# Patient Record
Sex: MALE | Race: WHITE | Employment: FULL TIME | URBAN - METROPOLITAN AREA
[De-identification: names, ages, dates, MRNs, and addresses within clinical notes are randomized per-mention and may not be internally consistent; named-entity substitution may affect disease eponyms.]

---

## 2020-02-15 ENCOUNTER — APPOINTMENT (OUTPATIENT)
Dept: GENERAL RADIOLOGY | Age: 25
DRG: 026 | End: 2020-02-15
Payer: MEDICAID

## 2020-02-15 ENCOUNTER — APPOINTMENT (OUTPATIENT)
Dept: CT IMAGING | Age: 25
DRG: 026 | End: 2020-02-15
Payer: MEDICAID

## 2020-02-15 ENCOUNTER — ANESTHESIA EVENT (OUTPATIENT)
Dept: EMERGENCY DEPT | Age: 25
DRG: 026 | End: 2020-02-15
Payer: MEDICAID

## 2020-02-15 ENCOUNTER — ANESTHESIA (OUTPATIENT)
Dept: EMERGENCY DEPT | Age: 25
DRG: 026 | End: 2020-02-15
Payer: MEDICAID

## 2020-02-15 ENCOUNTER — HOSPITAL ENCOUNTER (INPATIENT)
Age: 25
LOS: 5 days | Discharge: HOME | DRG: 026 | End: 2020-02-20
Attending: EMERGENCY MEDICINE | Admitting: SURGERY
Payer: MEDICAID

## 2020-02-15 PROBLEM — T14.90XA TRAUMA: Status: ACTIVE | Noted: 2020-02-15

## 2020-02-15 LAB
ABO/RH: NORMAL
ACETAMINOPHEN LEVEL: <5 MCG/ML (ref 10–30)
ALBUMIN SERPL-MCNC: 4.5 G/DL (ref 3.5–5.2)
ALP BLD-CCNC: 71 U/L (ref 40–129)
ALT SERPL-CCNC: 12 U/L (ref 0–40)
ANGLE (CLOT STRENGTH): 74.6 DEGREE (ref 59–74)
ANION GAP SERPL CALCULATED.3IONS-SCNC: 19 MMOL/L (ref 7–16)
ANTIBODY SCREEN: NORMAL
APTT: 24.6 SEC (ref 24.5–35.1)
AST SERPL-CCNC: 16 U/L (ref 0–39)
B.E.: -2.6 MMOL/L (ref -3–3)
BILIRUB SERPL-MCNC: 1 MG/DL (ref 0–1.2)
BUN BLDV-MCNC: 27 MG/DL (ref 6–20)
CALCIUM SERPL-MCNC: 9.6 MG/DL (ref 8.6–10.2)
CHLORIDE BLD-SCNC: 97 MMOL/L (ref 98–107)
CO2: 20 MMOL/L (ref 22–29)
COHB: 1.1 % (ref 0–1.5)
CREAT SERPL-MCNC: 0.9 MG/DL (ref 0.7–1.2)
CRITICAL: ABNORMAL
DATE ANALYZED: ABNORMAL
DATE OF COLLECTION: ABNORMAL
EPL-TEG: 3.2 % (ref 0–15)
ETHANOL: <10 MG/DL (ref 0–0.08)
G-TEG: 9.8 K D/SC (ref 4.5–11)
GFR AFRICAN AMERICAN: >60
GFR NON-AFRICAN AMERICAN: >60 ML/MIN/1.73
GLUCOSE BLD-MCNC: 208 MG/DL (ref 74–99)
HCO3: 21 MMOL/L (ref 22–26)
HCT VFR BLD CALC: 46 % (ref 37–54)
HEMOGLOBIN: 14.6 G/DL (ref 12.5–16.5)
HHB: 0.4 % (ref 0–5)
INR BLD: 1
K (CLOTTING TIME): 1.1 MIN (ref 1–3)
LAB: ABNORMAL
LACTIC ACID: 6 MMOL/L (ref 0.5–2.2)
LY30 (FIBRINOLYSIS): 3.2 % (ref 0–8)
Lab: ABNORMAL
MA (MAX AMPLITUDE): 66.2 MM (ref 50–70)
MCH RBC QN AUTO: 28.5 PG (ref 26–35)
MCHC RBC AUTO-ENTMCNC: 31.7 % (ref 32–34.5)
MCV RBC AUTO: 89.7 FL (ref 80–99.9)
METHB: 0.4 % (ref 0–1.5)
MODE: ABNORMAL
O2 CONTENT: 22.7 ML/DL
O2 SATURATION: 99.6 % (ref 92–98.5)
O2HB: 98.1 % (ref 94–97)
OPERATOR ID: 5721
PATIENT TEMP: 37 C
PCO2: 33.7 MMHG (ref 35–45)
PDW BLD-RTO: 12.8 FL (ref 11.5–15)
PH BLOOD GAS: 7.41 (ref 7.35–7.45)
PLATELET # BLD: 218 E9/L (ref 130–450)
PMV BLD AUTO: 10.1 FL (ref 7–12)
PO2: 453.2 MMHG (ref 75–100)
POTASSIUM SERPL-SCNC: 3.5 MMOL/L (ref 3.5–5)
POTASSIUM SERPL-SCNC: 3.55 MMOL/L (ref 3.5–5)
PROTHROMBIN TIME: 11.6 SEC (ref 9.3–12.4)
R (REACTION TIME): 3.4 MIN (ref 5–10)
RBC # BLD: 5.13 E12/L (ref 3.8–5.8)
SALICYLATE, SERUM: <0.3 MG/DL (ref 0–30)
SODIUM BLD-SCNC: 136 MMOL/L (ref 132–146)
SOURCE, BLOOD GAS: ABNORMAL
THB: 15.6 G/DL (ref 11.5–16.5)
TIME ANALYZED: 2000
TOTAL PROTEIN: 7.2 G/DL (ref 6.4–8.3)
TRICYCLIC ANTIDEPRESSANTS SCREEN SERUM: NEGATIVE NG/ML
WBC # BLD: 17.7 E9/L (ref 4.5–11.5)

## 2020-02-15 PROCEDURE — 82805 BLOOD GASES W/O2 SATURATION: CPT

## 2020-02-15 PROCEDURE — 85610 PROTHROMBIN TIME: CPT

## 2020-02-15 PROCEDURE — 12013 RPR F/E/E/N/L/M 2.6-5.0 CM: CPT | Performed by: SURGERY

## 2020-02-15 PROCEDURE — 0JQ10ZZ REPAIR FACE SUBCUTANEOUS TISSUE AND FASCIA, OPEN APPROACH: ICD-10-PCS | Performed by: SURGERY

## 2020-02-15 PROCEDURE — 70498 CT ANGIOGRAPHY NECK: CPT

## 2020-02-15 PROCEDURE — 86850 RBC ANTIBODY SCREEN: CPT

## 2020-02-15 PROCEDURE — 70486 CT MAXILLOFACIAL W/O DYE: CPT

## 2020-02-15 PROCEDURE — 99285 EMERGENCY DEPT VISIT HI MDM: CPT

## 2020-02-15 PROCEDURE — 6810039001 HC L1 TRAUMA PRIORITY

## 2020-02-15 PROCEDURE — 6360000004 HC RX CONTRAST MEDICATION: Performed by: RADIOLOGY

## 2020-02-15 PROCEDURE — 6360000002 HC RX W HCPCS

## 2020-02-15 PROCEDURE — 71045 X-RAY EXAM CHEST 1 VIEW: CPT

## 2020-02-15 PROCEDURE — 2580000003 HC RX 258: Performed by: STUDENT IN AN ORGANIZED HEALTH CARE EDUCATION/TRAINING PROGRAM

## 2020-02-15 PROCEDURE — 72128 CT CHEST SPINE W/O DYE: CPT

## 2020-02-15 PROCEDURE — 85576 BLOOD PLATELET AGGREGATION: CPT

## 2020-02-15 PROCEDURE — 71260 CT THORAX DX C+: CPT

## 2020-02-15 PROCEDURE — 72170 X-RAY EXAM OF PELVIS: CPT

## 2020-02-15 PROCEDURE — 80307 DRUG TEST PRSMV CHEM ANLYZR: CPT

## 2020-02-15 PROCEDURE — 86901 BLOOD TYPING SEROLOGIC RH(D): CPT

## 2020-02-15 PROCEDURE — 2000000000 HC ICU R&B

## 2020-02-15 PROCEDURE — 02HV33Z INSERTION OF INFUSION DEVICE INTO SUPERIOR VENA CAVA, PERCUTANEOUS APPROACH: ICD-10-PCS | Performed by: SURGERY

## 2020-02-15 PROCEDURE — 72131 CT LUMBAR SPINE W/O DYE: CPT

## 2020-02-15 PROCEDURE — 6360000002 HC RX W HCPCS: Performed by: STUDENT IN AN ORGANIZED HEALTH CARE EDUCATION/TRAINING PROGRAM

## 2020-02-15 PROCEDURE — 87081 CULTURE SCREEN ONLY: CPT

## 2020-02-15 PROCEDURE — 0BH17EZ INSERTION OF ENDOTRACHEAL AIRWAY INTO TRACHEA, VIA NATURAL OR ARTIFICIAL OPENING: ICD-10-PCS | Performed by: SURGERY

## 2020-02-15 PROCEDURE — 36415 COLL VENOUS BLD VENIPUNCTURE: CPT

## 2020-02-15 PROCEDURE — 72125 CT NECK SPINE W/O DYE: CPT

## 2020-02-15 PROCEDURE — 70450 CT HEAD/BRAIN W/O DYE: CPT

## 2020-02-15 PROCEDURE — 36620 INSERTION CATHETER ARTERY: CPT | Performed by: SURGERY

## 2020-02-15 PROCEDURE — 31500 INSERT EMERGENCY AIRWAY: CPT

## 2020-02-15 PROCEDURE — 85027 COMPLETE CBC AUTOMATED: CPT

## 2020-02-15 PROCEDURE — 6370000000 HC RX 637 (ALT 250 FOR IP): Performed by: STUDENT IN AN ORGANIZED HEALTH CARE EDUCATION/TRAINING PROGRAM

## 2020-02-15 PROCEDURE — 94002 VENT MGMT INPAT INIT DAY: CPT

## 2020-02-15 PROCEDURE — 83605 ASSAY OF LACTIC ACID: CPT

## 2020-02-15 PROCEDURE — 31500 INSERT EMERGENCY AIRWAY: CPT | Performed by: NURSE ANESTHETIST, CERTIFIED REGISTERED

## 2020-02-15 PROCEDURE — 85384 FIBRINOGEN ACTIVITY: CPT

## 2020-02-15 PROCEDURE — 86900 BLOOD TYPING SEROLOGIC ABO: CPT

## 2020-02-15 PROCEDURE — 2500000003 HC RX 250 WO HCPCS: Performed by: STUDENT IN AN ORGANIZED HEALTH CARE EDUCATION/TRAINING PROGRAM

## 2020-02-15 PROCEDURE — 85730 THROMBOPLASTIN TIME PARTIAL: CPT

## 2020-02-15 PROCEDURE — 5A1945Z RESPIRATORY VENTILATION, 24-96 CONSECUTIVE HOURS: ICD-10-PCS | Performed by: SURGERY

## 2020-02-15 PROCEDURE — 0JQ00ZZ REPAIR SCALP SUBCUTANEOUS TISSUE AND FASCIA, OPEN APPROACH: ICD-10-PCS | Performed by: SURGERY

## 2020-02-15 PROCEDURE — G0480 DRUG TEST DEF 1-7 CLASSES: HCPCS

## 2020-02-15 PROCEDURE — 36620 INSERTION CATHETER ARTERY: CPT

## 2020-02-15 PROCEDURE — 74177 CT ABD & PELVIS W/CONTRAST: CPT

## 2020-02-15 PROCEDURE — 80053 COMPREHEN METABOLIC PANEL: CPT

## 2020-02-15 PROCEDURE — 12002 RPR S/N/AX/GEN/TRNK2.6-7.5CM: CPT | Performed by: SURGERY

## 2020-02-15 PROCEDURE — 84132 ASSAY OF SERUM POTASSIUM: CPT

## 2020-02-15 PROCEDURE — 36556 INSERT NON-TUNNEL CV CATH: CPT | Performed by: SURGERY

## 2020-02-15 PROCEDURE — 2580000003 HC RX 258: Performed by: RADIOLOGY

## 2020-02-15 PROCEDURE — 85347 COAGULATION TIME ACTIVATED: CPT

## 2020-02-15 PROCEDURE — 36569 INSJ PICC 5 YR+ W/O IMAGING: CPT

## 2020-02-15 PROCEDURE — 99291 CRITICAL CARE FIRST HOUR: CPT | Performed by: SURGERY

## 2020-02-15 RX ORDER — LEVETIRACETAM 500 MG/1
1000 TABLET ORAL 2 TIMES DAILY
Status: DISCONTINUED | OUTPATIENT
Start: 2020-02-16 | End: 2020-02-16 | Stop reason: CLARIF

## 2020-02-15 RX ORDER — SODIUM CHLORIDE 9 MG/ML
INJECTION, SOLUTION INTRAVENOUS CONTINUOUS
Status: DISCONTINUED | OUTPATIENT
Start: 2020-02-15 | End: 2020-02-18

## 2020-02-15 RX ORDER — MIDAZOLAM HYDROCHLORIDE 1 MG/ML
4 INJECTION INTRAMUSCULAR; INTRAVENOUS ONCE
Status: COMPLETED | OUTPATIENT
Start: 2020-02-15 | End: 2020-02-15

## 2020-02-15 RX ORDER — LEVETIRACETAM 10 MG/ML
INJECTION INTRAVASCULAR
Status: COMPLETED
Start: 2020-02-15 | End: 2020-02-15

## 2020-02-15 RX ORDER — SODIUM CHLORIDE 0.9 % (FLUSH) 0.9 %
10 SYRINGE (ML) INJECTION EVERY 12 HOURS SCHEDULED
Status: DISCONTINUED | OUTPATIENT
Start: 2020-02-15 | End: 2020-02-20 | Stop reason: HOSPADM

## 2020-02-15 RX ORDER — SODIUM CHLORIDE 9 MG/ML
10 INJECTION INTRAVENOUS DAILY
Status: DISCONTINUED | OUTPATIENT
Start: 2020-02-16 | End: 2020-02-17

## 2020-02-15 RX ORDER — PROPOFOL 10 MG/ML
INJECTION, EMULSION INTRAVENOUS
Status: COMPLETED
Start: 2020-02-15 | End: 2020-02-15

## 2020-02-15 RX ORDER — PROPOFOL 10 MG/ML
10 INJECTION, EMULSION INTRAVENOUS
Status: DISCONTINUED | OUTPATIENT
Start: 2020-02-15 | End: 2020-02-16

## 2020-02-15 RX ORDER — SODIUM CHLORIDE 0.9 % (FLUSH) 0.9 %
10 SYRINGE (ML) INJECTION PRN
Status: DISCONTINUED | OUTPATIENT
Start: 2020-02-15 | End: 2020-02-20 | Stop reason: HOSPADM

## 2020-02-15 RX ORDER — SODIUM CHLORIDE 0.9 % (FLUSH) 0.9 %
10 SYRINGE (ML) INJECTION ONCE
Status: COMPLETED | OUTPATIENT
Start: 2020-02-15 | End: 2020-02-15

## 2020-02-15 RX ORDER — CHLORHEXIDINE GLUCONATE 0.12 MG/ML
15 RINSE ORAL 2 TIMES DAILY
Status: DISCONTINUED | OUTPATIENT
Start: 2020-02-15 | End: 2020-02-17

## 2020-02-15 RX ORDER — FENTANYL CITRATE 50 UG/ML
INJECTION, SOLUTION INTRAMUSCULAR; INTRAVENOUS
Status: DISCONTINUED
Start: 2020-02-15 | End: 2020-02-16 | Stop reason: WASHOUT

## 2020-02-15 RX ORDER — LORAZEPAM 2 MG/ML
INJECTION INTRAMUSCULAR
Status: DISCONTINUED
Start: 2020-02-15 | End: 2020-02-16 | Stop reason: WASHOUT

## 2020-02-15 RX ORDER — MIDAZOLAM HYDROCHLORIDE 1 MG/ML
INJECTION INTRAMUSCULAR; INTRAVENOUS
Status: COMPLETED
Start: 2020-02-15 | End: 2020-02-15

## 2020-02-15 RX ORDER — PANTOPRAZOLE SODIUM 40 MG/10ML
40 INJECTION, POWDER, LYOPHILIZED, FOR SOLUTION INTRAVENOUS DAILY
Status: DISCONTINUED | OUTPATIENT
Start: 2020-02-16 | End: 2020-02-17

## 2020-02-15 RX ORDER — ACETAMINOPHEN 325 MG/1
650 TABLET ORAL EVERY 4 HOURS PRN
Status: DISCONTINUED | OUTPATIENT
Start: 2020-02-15 | End: 2020-02-16

## 2020-02-15 RX ORDER — FENTANYL CITRATE 50 UG/ML
INJECTION, SOLUTION INTRAMUSCULAR; INTRAVENOUS
Status: COMPLETED
Start: 2020-02-15 | End: 2020-02-15

## 2020-02-15 RX ORDER — ONDANSETRON 2 MG/ML
4 INJECTION INTRAMUSCULAR; INTRAVENOUS EVERY 6 HOURS PRN
Status: DISCONTINUED | OUTPATIENT
Start: 2020-02-15 | End: 2020-02-20 | Stop reason: HOSPADM

## 2020-02-15 RX ORDER — VECURONIUM BROMIDE 1 MG/ML
INJECTION, POWDER, LYOPHILIZED, FOR SOLUTION INTRAVENOUS
Status: DISPENSED
Start: 2020-02-15 | End: 2020-02-16

## 2020-02-15 RX ADMIN — SODIUM CHLORIDE: 9 INJECTION, SOLUTION INTRAVENOUS at 21:28

## 2020-02-15 RX ADMIN — SODIUM CHLORIDE, PRESERVATIVE FREE 10 ML: 5 INJECTION INTRAVENOUS at 21:30

## 2020-02-15 RX ADMIN — MIDAZOLAM 4 MG: 1 INJECTION INTRAMUSCULAR; INTRAVENOUS at 21:24

## 2020-02-15 RX ADMIN — MIDAZOLAM HYDROCHLORIDE 4 MG: 1 INJECTION INTRAMUSCULAR; INTRAVENOUS at 21:24

## 2020-02-15 RX ADMIN — IOPAMIDOL 110 ML: 755 INJECTION, SOLUTION INTRAVENOUS at 20:44

## 2020-02-15 RX ADMIN — PROPOFOL INJECTABLE EMULSION 50 MCG/KG/MIN: 10 INJECTION, EMULSION INTRAVENOUS at 23:29

## 2020-02-15 RX ADMIN — Medication 10 ML: at 21:29

## 2020-02-15 RX ADMIN — DEXTROSE MONOHYDRATE 1250 MG: 50 INJECTION, SOLUTION INTRAVENOUS at 23:30

## 2020-02-15 RX ADMIN — PROPOFOL INJECTABLE EMULSION 40 MCG/KG/MIN: 10 INJECTION, EMULSION INTRAVENOUS at 20:02

## 2020-02-15 RX ADMIN — Medication 25 MCG/HR: at 21:26

## 2020-02-15 RX ADMIN — FENTANYL CITRATE 100 MCG/ML: 0.05 INJECTION, SOLUTION INTRAMUSCULAR; INTRAVENOUS at 20:11

## 2020-02-15 RX ADMIN — LEVETIRACETAM: 10 INJECTION, SOLUTION INTRAVENOUS at 20:04

## 2020-02-15 RX ADMIN — CHLORHEXIDINE GLUCONATE 0.12% ORAL RINSE 15 ML: 1.2 LIQUID ORAL at 21:31

## 2020-02-15 RX ADMIN — CEFEPIME HYDROCHLORIDE 2 G: 2 INJECTION, POWDER, FOR SOLUTION INTRAVENOUS at 23:27

## 2020-02-15 ASSESSMENT — PAIN SCALES - GENERAL
PAINLEVEL_OUTOF10: 8
PAINLEVEL_OUTOF10: 0

## 2020-02-15 ASSESSMENT — PULMONARY FUNCTION TESTS: PIF_VALUE: 19

## 2020-02-16 ENCOUNTER — APPOINTMENT (OUTPATIENT)
Dept: CT IMAGING | Age: 25
DRG: 026 | End: 2020-02-16
Payer: MEDICAID

## 2020-02-16 ENCOUNTER — APPOINTMENT (OUTPATIENT)
Dept: GENERAL RADIOLOGY | Age: 25
DRG: 026 | End: 2020-02-16
Payer: MEDICAID

## 2020-02-16 PROBLEM — S06.5XAA SDH (SUBDURAL HEMATOMA): Status: ACTIVE | Noted: 2020-02-16

## 2020-02-16 PROBLEM — S02.92XA CLOSED EXTENSIVE FACIAL FRACTURES (HCC): Status: ACTIVE | Noted: 2020-02-16

## 2020-02-16 PROBLEM — S02.40DA CLOSED FRACTURE OF LEFT SIDE OF MAXILLA (HCC): Status: ACTIVE | Noted: 2020-02-16

## 2020-02-16 PROBLEM — S01.01XA SCALP LACERATION: Status: ACTIVE | Noted: 2020-02-16

## 2020-02-16 PROBLEM — S02.85XA RIGHT ORBITAL FRACTURE (HCC): Status: ACTIVE | Noted: 2020-02-16

## 2020-02-16 PROBLEM — S01.412A LACERATION OF LEFT CHEEK: Status: ACTIVE | Noted: 2020-02-16

## 2020-02-16 PROBLEM — I60.9 SAH (SUBARACHNOID HEMORRHAGE) (HCC): Status: ACTIVE | Noted: 2020-02-16

## 2020-02-16 PROBLEM — E87.20 LACTIC ACIDOSIS: Status: ACTIVE | Noted: 2020-02-16

## 2020-02-16 PROBLEM — J96.01 ACUTE RESPIRATORY FAILURE WITH HYPOXIA (HCC): Status: ACTIVE | Noted: 2020-02-16

## 2020-02-16 PROBLEM — S02.91XB OPEN SKULL FRACTURE (HCC): Status: ACTIVE | Noted: 2020-02-16

## 2020-02-16 LAB
AADO2: 140.2 MMHG
ALBUMIN SERPL-MCNC: 3.3 G/DL (ref 3.5–5.2)
ALP BLD-CCNC: 51 U/L (ref 40–129)
ALT SERPL-CCNC: 8 U/L (ref 0–40)
AMPHETAMINE SCREEN, URINE: POSITIVE
ANION GAP SERPL CALCULATED.3IONS-SCNC: 10 MMOL/L (ref 7–16)
AST SERPL-CCNC: 23 U/L (ref 0–39)
B.E.: -0.7 MMOL/L (ref -3–3)
BARBITURATE SCREEN URINE: NOT DETECTED
BASOPHILS ABSOLUTE: 0.03 E9/L (ref 0–0.2)
BASOPHILS RELATIVE PERCENT: 0.4 % (ref 0–2)
BENZODIAZEPINE SCREEN, URINE: POSITIVE
BILIRUB SERPL-MCNC: 1.4 MG/DL (ref 0–1.2)
BUN BLDV-MCNC: 23 MG/DL (ref 6–20)
CALCIUM IONIZED: 1.2 MMOL/L (ref 1.15–1.33)
CALCIUM SERPL-MCNC: 8.1 MG/DL (ref 8.6–10.2)
CANNABINOID SCREEN URINE: POSITIVE
CHLORIDE BLD-SCNC: 107 MMOL/L (ref 98–107)
CO2: 22 MMOL/L (ref 22–29)
COCAINE METABOLITE SCREEN URINE: NOT DETECTED
COHB: 0.2 % (ref 0–1.5)
CREAT SERPL-MCNC: 0.8 MG/DL (ref 0.7–1.2)
CRITICAL: ABNORMAL
DATE ANALYZED: ABNORMAL
DATE OF COLLECTION: ABNORMAL
EOSINOPHILS ABSOLUTE: 0.1 E9/L (ref 0.05–0.5)
EOSINOPHILS RELATIVE PERCENT: 1.2 % (ref 0–6)
FENTANYL SCREEN, URINE: NOT DETECTED
FIO2: 50 %
GFR AFRICAN AMERICAN: >60
GFR NON-AFRICAN AMERICAN: >60 ML/MIN/1.73
GLUCOSE BLD-MCNC: 123 MG/DL (ref 74–99)
HBA1C MFR BLD: 5.6 % (ref 4–5.6)
HCO3: 22 MMOL/L (ref 22–26)
HCT VFR BLD CALC: 36.7 % (ref 37–54)
HEMOGLOBIN: 11.9 G/DL (ref 12.5–16.5)
HHB: 1.2 % (ref 0–5)
IMMATURE GRANULOCYTES #: 0.02 E9/L
IMMATURE GRANULOCYTES %: 0.2 % (ref 0–5)
LAB: ABNORMAL
LACTIC ACID: 0.8 MMOL/L (ref 0.5–2.2)
LACTIC ACID: 1.1 MMOL/L (ref 0.5–2.2)
LYMPHOCYTES ABSOLUTE: 1.41 E9/L (ref 1.5–4)
LYMPHOCYTES RELATIVE PERCENT: 16.6 % (ref 20–42)
Lab: ABNORMAL
Lab: ABNORMAL
MAGNESIUM: 2 MG/DL (ref 1.6–2.6)
MCH RBC QN AUTO: 28.9 PG (ref 26–35)
MCHC RBC AUTO-ENTMCNC: 32.4 % (ref 32–34.5)
MCV RBC AUTO: 89.1 FL (ref 80–99.9)
METER GLUCOSE: 106 MG/DL (ref 74–99)
METER GLUCOSE: 123 MG/DL (ref 74–99)
METER GLUCOSE: 127 MG/DL (ref 74–99)
METER GLUCOSE: 141 MG/DL (ref 74–99)
METER GLUCOSE: 54 MG/DL (ref 74–99)
METER GLUCOSE: 57 MG/DL (ref 74–99)
METER GLUCOSE: 72 MG/DL (ref 74–99)
METER GLUCOSE: 83 MG/DL (ref 74–99)
METHADONE SCREEN, URINE: NOT DETECTED
METHB: 0.2 % (ref 0–1.5)
MODE: AC
MONOCYTES ABSOLUTE: 0.99 E9/L (ref 0.1–0.95)
MONOCYTES RELATIVE PERCENT: 11.6 % (ref 2–12)
NEUTROPHILS ABSOLUTE: 5.95 E9/L (ref 1.8–7.3)
NEUTROPHILS RELATIVE PERCENT: 70 % (ref 43–80)
O2 CONTENT: 18 ML/DL
O2 SATURATION: 98.8 % (ref 92–98.5)
O2HB: 98.4 % (ref 94–97)
OPERATOR ID: 2577
OPIATE SCREEN URINE: NOT DETECTED
OXYCODONE URINE: NOT DETECTED
PATIENT TEMP: 37 C
PCO2: 30.4 MMHG (ref 35–45)
PDW BLD-RTO: 13 FL (ref 11.5–15)
PEEP/CPAP: 5 CMH2O
PFO2: 3.39 MMHG/%
PH BLOOD GAS: 7.48 (ref 7.35–7.45)
PHENCYCLIDINE SCREEN URINE: NOT DETECTED
PHOSPHORUS: 3.4 MG/DL (ref 2.5–4.5)
PLATELET # BLD: 148 E9/L (ref 130–450)
PMV BLD AUTO: 10.2 FL (ref 7–12)
PO2: 169.6 MMHG (ref 75–100)
POTASSIUM REFLEX MAGNESIUM: 3.2 MMOL/L (ref 3.5–5)
RBC # BLD: 4.12 E12/L (ref 3.8–5.8)
RI(T): 0.83
RR MECHANICAL: 16 B/MIN
SODIUM BLD-SCNC: 139 MMOL/L (ref 132–146)
SOURCE, BLOOD GAS: ABNORMAL
THB: 12.8 G/DL (ref 11.5–16.5)
TIME ANALYZED: 520
TOTAL PROTEIN: 5.2 G/DL (ref 6.4–8.3)
VT MECHANICAL: 500 ML
WBC # BLD: 8.5 E9/L (ref 4.5–11.5)

## 2020-02-16 PROCEDURE — 94003 VENT MGMT INPAT SUBQ DAY: CPT

## 2020-02-16 PROCEDURE — 83605 ASSAY OF LACTIC ACID: CPT

## 2020-02-16 PROCEDURE — 83036 HEMOGLOBIN GLYCOSYLATED A1C: CPT

## 2020-02-16 PROCEDURE — 71045 X-RAY EXAM CHEST 1 VIEW: CPT

## 2020-02-16 PROCEDURE — 85025 COMPLETE CBC W/AUTO DIFF WBC: CPT

## 2020-02-16 PROCEDURE — 6360000002 HC RX W HCPCS: Performed by: STUDENT IN AN ORGANIZED HEALTH CARE EDUCATION/TRAINING PROGRAM

## 2020-02-16 PROCEDURE — 2500000003 HC RX 250 WO HCPCS: Performed by: STUDENT IN AN ORGANIZED HEALTH CARE EDUCATION/TRAINING PROGRAM

## 2020-02-16 PROCEDURE — 99222 1ST HOSP IP/OBS MODERATE 55: CPT | Performed by: NEUROLOGICAL SURGERY

## 2020-02-16 PROCEDURE — 84100 ASSAY OF PHOSPHORUS: CPT

## 2020-02-16 PROCEDURE — 80053 COMPREHEN METABOLIC PANEL: CPT

## 2020-02-16 PROCEDURE — 82962 GLUCOSE BLOOD TEST: CPT

## 2020-02-16 PROCEDURE — 2000000000 HC ICU R&B

## 2020-02-16 PROCEDURE — 99223 1ST HOSP IP/OBS HIGH 75: CPT | Performed by: OTOLARYNGOLOGY

## 2020-02-16 PROCEDURE — 82330 ASSAY OF CALCIUM: CPT

## 2020-02-16 PROCEDURE — 6370000000 HC RX 637 (ALT 250 FOR IP): Performed by: STUDENT IN AN ORGANIZED HEALTH CARE EDUCATION/TRAINING PROGRAM

## 2020-02-16 PROCEDURE — 83735 ASSAY OF MAGNESIUM: CPT

## 2020-02-16 PROCEDURE — 80307 DRUG TEST PRSMV CHEM ANLYZR: CPT

## 2020-02-16 PROCEDURE — 2580000003 HC RX 258: Performed by: STUDENT IN AN ORGANIZED HEALTH CARE EDUCATION/TRAINING PROGRAM

## 2020-02-16 PROCEDURE — 82805 BLOOD GASES W/O2 SATURATION: CPT

## 2020-02-16 PROCEDURE — 37799 UNLISTED PX VASCULAR SURGERY: CPT

## 2020-02-16 PROCEDURE — 36415 COLL VENOUS BLD VENIPUNCTURE: CPT

## 2020-02-16 PROCEDURE — 2580000003 HC RX 258: Performed by: SURGERY

## 2020-02-16 PROCEDURE — 70450 CT HEAD/BRAIN W/O DYE: CPT

## 2020-02-16 PROCEDURE — 99291 CRITICAL CARE FIRST HOUR: CPT | Performed by: SURGERY

## 2020-02-16 PROCEDURE — C9113 INJ PANTOPRAZOLE SODIUM, VIA: HCPCS | Performed by: STUDENT IN AN ORGANIZED HEALTH CARE EDUCATION/TRAINING PROGRAM

## 2020-02-16 PROCEDURE — 6370000000 HC RX 637 (ALT 250 FOR IP): Performed by: SURGERY

## 2020-02-16 RX ORDER — LEVETIRACETAM 500 MG/1
1000 TABLET ORAL 2 TIMES DAILY
Status: DISCONTINUED | OUTPATIENT
Start: 2020-02-16 | End: 2020-02-16

## 2020-02-16 RX ORDER — OXYCODONE HCL 5 MG/5 ML
5 SOLUTION, ORAL ORAL EVERY 4 HOURS
Status: DISCONTINUED | OUTPATIENT
Start: 2020-02-16 | End: 2020-02-17

## 2020-02-16 RX ORDER — LEVETIRACETAM 100 MG/ML
1000 SOLUTION ORAL 2 TIMES DAILY
Status: DISCONTINUED | OUTPATIENT
Start: 2020-02-16 | End: 2020-02-17

## 2020-02-16 RX ORDER — ACETAMINOPHEN 160 MG/5ML
650 SOLUTION ORAL EVERY 4 HOURS PRN
Status: DISCONTINUED | OUTPATIENT
Start: 2020-02-16 | End: 2020-02-17

## 2020-02-16 RX ORDER — DEXTROSE MONOHYDRATE 25 G/50ML
12.5 INJECTION, SOLUTION INTRAVENOUS PRN
Status: DISCONTINUED | OUTPATIENT
Start: 2020-02-16 | End: 2020-02-18

## 2020-02-16 RX ORDER — POTASSIUM CHLORIDE 29.8 MG/ML
20 INJECTION INTRAVENOUS
Status: COMPLETED | OUTPATIENT
Start: 2020-02-16 | End: 2020-02-16

## 2020-02-16 RX ORDER — 0.9 % SODIUM CHLORIDE 0.9 %
1000 INTRAVENOUS SOLUTION INTRAVENOUS ONCE
Status: DISCONTINUED | OUTPATIENT
Start: 2020-02-16 | End: 2020-02-18

## 2020-02-16 RX ORDER — DEXTROSE MONOHYDRATE 50 MG/ML
100 INJECTION, SOLUTION INTRAVENOUS PRN
Status: DISCONTINUED | OUTPATIENT
Start: 2020-02-16 | End: 2020-02-18

## 2020-02-16 RX ORDER — NICOTINE POLACRILEX 4 MG
15 LOZENGE BUCCAL PRN
Status: DISCONTINUED | OUTPATIENT
Start: 2020-02-16 | End: 2020-02-18

## 2020-02-16 RX ADMIN — SODIUM CHLORIDE: 9 INJECTION, SOLUTION INTRAVENOUS at 15:30

## 2020-02-16 RX ADMIN — LEVETIRACETAM 1000 MG: 500 SOLUTION ORAL at 09:06

## 2020-02-16 RX ADMIN — SODIUM CHLORIDE, PRESERVATIVE FREE 10 ML: 5 INJECTION INTRAVENOUS at 08:55

## 2020-02-16 RX ADMIN — OXYCODONE HYDROCHLORIDE 5 MG: 5 SOLUTION ORAL at 20:07

## 2020-02-16 RX ADMIN — CEFEPIME HYDROCHLORIDE 2 G: 2 INJECTION, POWDER, FOR SOLUTION INTRAVENOUS at 10:45

## 2020-02-16 RX ADMIN — OXYCODONE HYDROCHLORIDE 5 MG: 5 SOLUTION ORAL at 16:02

## 2020-02-16 RX ADMIN — OXYCODONE HYDROCHLORIDE 5 MG: 5 SOLUTION ORAL at 08:27

## 2020-02-16 RX ADMIN — CEFEPIME HYDROCHLORIDE 2 G: 2 INJECTION, POWDER, FOR SOLUTION INTRAVENOUS at 23:05

## 2020-02-16 RX ADMIN — Medication 10 ML: at 20:08

## 2020-02-16 RX ADMIN — SODIUM CHLORIDE: 9 INJECTION, SOLUTION INTRAVENOUS at 14:49

## 2020-02-16 RX ADMIN — SODIUM CHLORIDE: 9 INJECTION, SOLUTION INTRAVENOUS at 03:00

## 2020-02-16 RX ADMIN — PROPOFOL INJECTABLE EMULSION 50 MCG/KG/MIN: 10 INJECTION, EMULSION INTRAVENOUS at 03:00

## 2020-02-16 RX ADMIN — OXYCODONE HYDROCHLORIDE 5 MG: 5 SOLUTION ORAL at 12:40

## 2020-02-16 RX ADMIN — PANTOPRAZOLE SODIUM 40 MG: 40 INJECTION, POWDER, FOR SOLUTION INTRAVENOUS at 08:55

## 2020-02-16 RX ADMIN — CHLORHEXIDINE GLUCONATE 0.12% ORAL RINSE 15 ML: 1.2 LIQUID ORAL at 08:28

## 2020-02-16 RX ADMIN — POTASSIUM CHLORIDE 20 MEQ: 29.8 INJECTION, SOLUTION INTRAVENOUS at 09:29

## 2020-02-16 RX ADMIN — POTASSIUM CHLORIDE 20 MEQ: 29.8 INJECTION, SOLUTION INTRAVENOUS at 08:27

## 2020-02-16 RX ADMIN — LEVETIRACETAM 1000 MG: 500 SOLUTION ORAL at 20:07

## 2020-02-16 RX ADMIN — PROPOFOL INJECTABLE EMULSION 50 MCG/KG/MIN: 10 INJECTION, EMULSION INTRAVENOUS at 04:01

## 2020-02-16 RX ADMIN — CHLORHEXIDINE GLUCONATE 0.12% ORAL RINSE 15 ML: 1.2 LIQUID ORAL at 20:07

## 2020-02-16 RX ADMIN — PROPOFOL INJECTABLE EMULSION 50 MCG/KG/MIN: 10 INJECTION, EMULSION INTRAVENOUS at 09:42

## 2020-02-16 RX ADMIN — ACETAMINOPHEN ORAL SOLUTION 650 MG: 650 SOLUTION ORAL at 22:21

## 2020-02-16 RX ADMIN — DEXMEDETOMIDINE 0.2 MCG/KG/HR: 100 INJECTION, SOLUTION, CONCENTRATE INTRAVENOUS at 09:46

## 2020-02-16 RX ADMIN — Medication 150 MCG/HR: at 05:28

## 2020-02-16 RX ADMIN — VANCOMYCIN HYDROCHLORIDE 1000 MG: 1 INJECTION, POWDER, LYOPHILIZED, FOR SOLUTION INTRAVENOUS at 12:40

## 2020-02-16 RX ADMIN — Medication 10 ML: at 08:28

## 2020-02-16 RX ADMIN — INSULIN LISPRO 1 UNITS: 100 INJECTION, SOLUTION INTRAVENOUS; SUBCUTANEOUS at 00:29

## 2020-02-16 RX ADMIN — Medication 125 MCG/HR: at 15:00

## 2020-02-16 RX ADMIN — SODIUM CHLORIDE, PRESERVATIVE FREE 10 ML: 5 INJECTION INTRAVENOUS at 16:50

## 2020-02-16 RX ADMIN — DEXTROSE 50 % IN WATER (D50W) INTRAVENOUS SYRINGE 12.5 G: at 04:10

## 2020-02-16 RX ADMIN — POTASSIUM BICARBONATE 40 MEQ: 782 TABLET, EFFERVESCENT ORAL at 08:27

## 2020-02-16 RX ADMIN — VANCOMYCIN HYDROCHLORIDE 1000 MG: 1 INJECTION, POWDER, LYOPHILIZED, FOR SOLUTION INTRAVENOUS at 20:08

## 2020-02-16 ASSESSMENT — PULMONARY FUNCTION TESTS
PIF_VALUE: 19
PIF_VALUE: 18
PIF_VALUE: 18
PIF_VALUE: 23
PIF_VALUE: 18
PIF_VALUE: 20
PIF_VALUE: 21
PIF_VALUE: 18
PIF_VALUE: 20
PIF_VALUE: 18
PIF_VALUE: 18
PIF_VALUE: 20
PIF_VALUE: 21
PIF_VALUE: 21
PIF_VALUE: 18
PIF_VALUE: 17
PIF_VALUE: 19
PIF_VALUE: 18
PIF_VALUE: 20
PIF_VALUE: 21
PIF_VALUE: 22
PIF_VALUE: 20
PIF_VALUE: 20
PIF_VALUE: 19
PIF_VALUE: 18
PIF_VALUE: 21
PIF_VALUE: 20
PIF_VALUE: 18
PIF_VALUE: 21
PIF_VALUE: 20
PIF_VALUE: 20
PIF_VALUE: 18
PIF_VALUE: 20
PIF_VALUE: 18

## 2020-02-16 ASSESSMENT — PAIN SCALES - GENERAL
PAINLEVEL_OUTOF10: 0
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
## 2020-02-16 NOTE — PLAN OF CARE
Problem: Falls - Risk of:  Goal: Will remain free from falls  Description  Will remain free from falls  Outcome: Met This Shift  Goal: Absence of physical injury  Description  Absence of physical injury  Outcome: Met This Shift     Problem: Pain:  Goal: Pain level will decrease  Description  Pain level will decrease  Outcome: Met This Shift  Goal: Control of acute pain  Description  Control of acute pain  Outcome: Met This Shift     Problem: Skin Integrity - Impaired:  Goal: Will show no infection signs and symptoms  Description  Will show no infection signs and symptoms  Outcome: Met This Shift  Goal: Absence of new skin breakdown  Description  Absence of new skin breakdown  Outcome: Met This Shift     Problem: Restraint Use - Nonviolent/Non-Self-Destructive Behavior:  Goal: Absence of restraint-related injury  Description  Absence of restraint-related injury  Outcome: Met This Shift
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
## 2020-02-16 NOTE — FLOWSHEET NOTE
Intubated/sedated/extremely agitated with stimulation. Reaches for ett and angel when restraints removed for repositioning. Unable to follow verbal direction.

## 2020-02-16 NOTE — FLOWSHEET NOTE
Intubated/sedated/agitated at times with stimulation. Reaches for ett and angel when restraints removed for repositioning. Unable to consistently follow verbal direction.

## 2020-02-16 NOTE — PROCEDURES
LACERATION REPAIR PROCEDURE NOTE    Indication: Laceration - 6cm Scalp and 4cm Left Cheek    Procedure: The patient was placed in the appropriate position. The area was then cleansed with betadine and draped in a sterile fashion and irrigated with normal saline. The scalp laceration was closed with staples. A second laceration to the left cheek was closed with 4-0 Prolene using running-locking sutures. The wound area was then dressed with bacitracin and a sterile dressing. Minimal debridement was preformed, flaps were aligned. No foreign body was identified. Total repaired wound length: 10 cm. Other Items: None    The patient tolerated the procedure well. Complications: None    Dr Bobby Mai was present for the procedure.       Janell Castro DO  Resident, PGY-2  2/15/2020  9:47 PM

## 2020-02-16 NOTE — ED NOTES
Pt intubated,1955 100 mg succ, 20 etomidate      Racheal Martin RN  02/15/20 [address]  02/15/20 [id number]       Racheal Martin RN  02/15/20 2044
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
## 2020-02-16 NOTE — PLAN OF CARE
Problem: Restraint Use - Nonviolent/Non-Self-Destructive Behavior:  Goal: Absence of restraint-related injury  Description  Absence of restraint-related injury  2/16/2020 1051 by Keith Campos RN  Outcome: Met This Shift  2/16/2020 0045 by Patrick Contreras RN  Outcome: Met This Shift  2/15/2020 2306 by Patrick Contreras RN  Outcome: Met This Shift     Problem: Restraint Use - Nonviolent/Non-Self-Destructive Behavior:  Goal: Absence of restraint indications  Description  Absence of restraint indications  2/16/2020 1051 by Keith Campos RN  Outcome: Not Met This Shift  2/16/2020 0045 by Patrick Contreras RN  Outcome: Not Met This Shift  2/15/2020 2306 by Patrick Contreras RN  Outcome: Not Met This Shift

## 2020-02-16 NOTE — FLOWSHEET NOTE
Will reach for lines and tubes when not restrained. Verbal redirection and education ineffective. At risk for self extubation and further injury.  Bilateral soft wrist restraints continue

## 2020-02-16 NOTE — ED NOTES
4 mg versed IVP. Pt turned, normal rectal tone.  Abrasions to left flank     Devyn Dupree RN  02/15/20 2006

## 2020-02-17 ENCOUNTER — APPOINTMENT (OUTPATIENT)
Dept: NEUROLOGY | Age: 25
DRG: 026 | End: 2020-02-17
Payer: MEDICAID

## 2020-02-17 ENCOUNTER — APPOINTMENT (OUTPATIENT)
Dept: GENERAL RADIOLOGY | Age: 25
DRG: 026 | End: 2020-02-17
Payer: MEDICAID

## 2020-02-17 LAB
AADO2: 102 MMHG
ALBUMIN SERPL-MCNC: 3.1 G/DL (ref 3.5–5.2)
ALP BLD-CCNC: 54 U/L (ref 40–129)
ALT SERPL-CCNC: 8 U/L (ref 0–40)
ANION GAP SERPL CALCULATED.3IONS-SCNC: 10 MMOL/L (ref 7–16)
AST SERPL-CCNC: 21 U/L (ref 0–39)
B.E.: -1.6 MMOL/L (ref -3–3)
BASOPHILS ABSOLUTE: 0.04 E9/L (ref 0–0.2)
BASOPHILS RELATIVE PERCENT: 0.5 % (ref 0–2)
BILIRUB SERPL-MCNC: 1.2 MG/DL (ref 0–1.2)
BUN BLDV-MCNC: 19 MG/DL (ref 6–20)
CALCIUM IONIZED: 1.2 MMOL/L (ref 1.15–1.33)
CALCIUM SERPL-MCNC: 8.4 MG/DL (ref 8.6–10.2)
CHLORIDE BLD-SCNC: 105 MMOL/L (ref 98–107)
CO2: 21 MMOL/L (ref 22–29)
COHB: 0 % (ref 0–1.5)
CREAT SERPL-MCNC: 0.9 MG/DL (ref 0.7–1.2)
CRITICAL: ABNORMAL
DATE ANALYZED: ABNORMAL
DATE OF COLLECTION: ABNORMAL
EOSINOPHILS ABSOLUTE: 0.19 E9/L (ref 0.05–0.5)
EOSINOPHILS RELATIVE PERCENT: 2.2 % (ref 0–6)
FIO2: 40 %
GFR AFRICAN AMERICAN: >60
GFR NON-AFRICAN AMERICAN: >60 ML/MIN/1.73
GLUCOSE BLD-MCNC: 137 MG/DL (ref 74–99)
HCO3: 22.9 MMOL/L (ref 22–26)
HCT VFR BLD CALC: 35.2 % (ref 37–54)
HEMOGLOBIN: 11.2 G/DL (ref 12.5–16.5)
HHB: 1.9 % (ref 0–5)
IMMATURE GRANULOCYTES #: 0.03 E9/L
IMMATURE GRANULOCYTES %: 0.4 % (ref 0–5)
LAB: ABNORMAL
LYMPHOCYTES ABSOLUTE: 1.95 E9/L (ref 1.5–4)
LYMPHOCYTES RELATIVE PERCENT: 23.1 % (ref 20–42)
Lab: ABNORMAL
MAGNESIUM: 1.9 MG/DL (ref 1.6–2.6)
MCH RBC QN AUTO: 29 PG (ref 26–35)
MCHC RBC AUTO-ENTMCNC: 31.8 % (ref 32–34.5)
MCV RBC AUTO: 91.2 FL (ref 80–99.9)
METER GLUCOSE: 77 MG/DL (ref 74–99)
METHB: 0.3 % (ref 0–1.5)
MODE: AC
MONOCYTES ABSOLUTE: 0.89 E9/L (ref 0.1–0.95)
MONOCYTES RELATIVE PERCENT: 10.5 % (ref 2–12)
NEUTROPHILS ABSOLUTE: 5.35 E9/L (ref 1.8–7.3)
NEUTROPHILS RELATIVE PERCENT: 63.3 % (ref 43–80)
O2 CONTENT: 16.4 ML/DL
O2 SATURATION: 98.1 % (ref 92–98.5)
O2HB: 97.8 % (ref 94–97)
OPERATOR ID: 2577
ORGANISM: ABNORMAL
PATIENT TEMP: 37 C
PCO2: 38 MMHG (ref 35–45)
PDW BLD-RTO: 13.1 FL (ref 11.5–15)
PEEP/CPAP: 5 CMH2O
PFO2: 3.24 MMHG/%
PH BLOOD GAS: 7.4 (ref 7.35–7.45)
PHOSPHORUS: 3.1 MG/DL (ref 2.5–4.5)
PLATELET # BLD: 122 E9/L (ref 130–450)
PMV BLD AUTO: 10.6 FL (ref 7–12)
PO2: 129.5 MMHG (ref 75–100)
POTASSIUM SERPL-SCNC: 4.1 MMOL/L (ref 3.5–5)
RBC # BLD: 3.86 E12/L (ref 3.8–5.8)
RI(T): 0.79
RR MECHANICAL: 14 B/MIN
SODIUM BLD-SCNC: 136 MMOL/L (ref 132–146)
SOURCE, BLOOD GAS: ABNORMAL
THB: 11.8 G/DL (ref 11.5–16.5)
TIME ANALYZED: 517
TOTAL PROTEIN: 5.3 G/DL (ref 6.4–8.3)
VANCOMYCIN TROUGH: 13.5 MCG/ML (ref 5–16)
VT MECHANICAL: 500 ML
WBC # BLD: 8.5 E9/L (ref 4.5–11.5)

## 2020-02-17 PROCEDURE — 97530 THERAPEUTIC ACTIVITIES: CPT

## 2020-02-17 PROCEDURE — 80202 ASSAY OF VANCOMYCIN: CPT

## 2020-02-17 PROCEDURE — 85025 COMPLETE CBC W/AUTO DIFF WBC: CPT

## 2020-02-17 PROCEDURE — 2580000003 HC RX 258: Performed by: STUDENT IN AN ORGANIZED HEALTH CARE EDUCATION/TRAINING PROGRAM

## 2020-02-17 PROCEDURE — 2700000000 HC OXYGEN THERAPY PER DAY

## 2020-02-17 PROCEDURE — 2500000003 HC RX 250 WO HCPCS: Performed by: STUDENT IN AN ORGANIZED HEALTH CARE EDUCATION/TRAINING PROGRAM

## 2020-02-17 PROCEDURE — 36592 COLLECT BLOOD FROM PICC: CPT

## 2020-02-17 PROCEDURE — 82805 BLOOD GASES W/O2 SATURATION: CPT

## 2020-02-17 PROCEDURE — 6360000002 HC RX W HCPCS: Performed by: STUDENT IN AN ORGANIZED HEALTH CARE EDUCATION/TRAINING PROGRAM

## 2020-02-17 PROCEDURE — 99291 CRITICAL CARE FIRST HOUR: CPT | Performed by: SURGERY

## 2020-02-17 PROCEDURE — 82330 ASSAY OF CALCIUM: CPT

## 2020-02-17 PROCEDURE — 97161 PT EVAL LOW COMPLEX 20 MIN: CPT

## 2020-02-17 PROCEDURE — 80053 COMPREHEN METABOLIC PANEL: CPT

## 2020-02-17 PROCEDURE — 36415 COLL VENOUS BLD VENIPUNCTURE: CPT

## 2020-02-17 PROCEDURE — 95816 EEG AWAKE AND DROWSY: CPT | Performed by: PSYCHIATRY & NEUROLOGY

## 2020-02-17 PROCEDURE — 94799 UNLISTED PULMONARY SVC/PX: CPT

## 2020-02-17 PROCEDURE — 6370000000 HC RX 637 (ALT 250 FOR IP): Performed by: STUDENT IN AN ORGANIZED HEALTH CARE EDUCATION/TRAINING PROGRAM

## 2020-02-17 PROCEDURE — 97166 OT EVAL MOD COMPLEX 45 MIN: CPT

## 2020-02-17 PROCEDURE — 6360000002 HC RX W HCPCS

## 2020-02-17 PROCEDURE — 71045 X-RAY EXAM CHEST 1 VIEW: CPT

## 2020-02-17 PROCEDURE — 95816 EEG AWAKE AND DROWSY: CPT

## 2020-02-17 PROCEDURE — 2000000000 HC ICU R&B

## 2020-02-17 PROCEDURE — 84100 ASSAY OF PHOSPHORUS: CPT

## 2020-02-17 PROCEDURE — 82962 GLUCOSE BLOOD TEST: CPT

## 2020-02-17 PROCEDURE — 94003 VENT MGMT INPAT SUBQ DAY: CPT

## 2020-02-17 PROCEDURE — 83735 ASSAY OF MAGNESIUM: CPT

## 2020-02-17 RX ORDER — PROMETHAZINE HYDROCHLORIDE 25 MG/ML
25 INJECTION, SOLUTION INTRAMUSCULAR; INTRAVENOUS ONCE
Status: COMPLETED | OUTPATIENT
Start: 2020-02-17 | End: 2020-02-17

## 2020-02-17 RX ORDER — PROMETHAZINE HYDROCHLORIDE 25 MG/ML
INJECTION, SOLUTION INTRAMUSCULAR; INTRAVENOUS
Status: COMPLETED
Start: 2020-02-17 | End: 2020-02-17

## 2020-02-17 RX ORDER — ACETAMINOPHEN 325 MG/1
650 TABLET ORAL EVERY 4 HOURS PRN
Status: DISCONTINUED | OUTPATIENT
Start: 2020-02-17 | End: 2020-02-19

## 2020-02-17 RX ORDER — OXYCODONE HYDROCHLORIDE 10 MG/1
10 TABLET ORAL EVERY 4 HOURS PRN
Status: DISCONTINUED | OUTPATIENT
Start: 2020-02-17 | End: 2020-02-18

## 2020-02-17 RX ORDER — LEVETIRACETAM 500 MG/1
500 TABLET ORAL 2 TIMES DAILY
Status: DISCONTINUED | OUTPATIENT
Start: 2020-02-17 | End: 2020-02-17

## 2020-02-17 RX ORDER — SCOLOPAMINE TRANSDERMAL SYSTEM 1 MG/1
1 PATCH, EXTENDED RELEASE TRANSDERMAL
Status: DISCONTINUED | OUTPATIENT
Start: 2020-02-17 | End: 2020-02-20 | Stop reason: HOSPADM

## 2020-02-17 RX ORDER — MORPHINE SULFATE 2 MG/ML
2 INJECTION, SOLUTION INTRAMUSCULAR; INTRAVENOUS EVERY 4 HOURS PRN
Status: DISCONTINUED | OUTPATIENT
Start: 2020-02-17 | End: 2020-02-18

## 2020-02-17 RX ORDER — LEVETIRACETAM 500 MG/1
1000 TABLET ORAL 2 TIMES DAILY
Status: DISCONTINUED | OUTPATIENT
Start: 2020-02-17 | End: 2020-02-20 | Stop reason: HOSPADM

## 2020-02-17 RX ORDER — OXYCODONE HYDROCHLORIDE 5 MG/1
5 TABLET ORAL EVERY 4 HOURS PRN
Status: DISCONTINUED | OUTPATIENT
Start: 2020-02-17 | End: 2020-02-18

## 2020-02-17 RX ADMIN — LEVETIRACETAM 1000 MG: 500 TABLET ORAL at 11:21

## 2020-02-17 RX ADMIN — CEFEPIME HYDROCHLORIDE 2 G: 2 INJECTION, POWDER, FOR SOLUTION INTRAVENOUS at 21:53

## 2020-02-17 RX ADMIN — OXYCODONE HYDROCHLORIDE 5 MG: 5 SOLUTION ORAL at 00:27

## 2020-02-17 RX ADMIN — PROMETHAZINE HYDROCHLORIDE 25 MG: 25 INJECTION INTRAMUSCULAR; INTRAVENOUS at 17:03

## 2020-02-17 RX ADMIN — SODIUM CHLORIDE: 9 INJECTION, SOLUTION INTRAVENOUS at 03:22

## 2020-02-17 RX ADMIN — CEFEPIME HYDROCHLORIDE 2 G: 2 INJECTION, POWDER, FOR SOLUTION INTRAVENOUS at 11:21

## 2020-02-17 RX ADMIN — MUPIROCIN: 20 OINTMENT TOPICAL at 11:50

## 2020-02-17 RX ADMIN — VANCOMYCIN HYDROCHLORIDE 1000 MG: 1 INJECTION, POWDER, LYOPHILIZED, FOR SOLUTION INTRAVENOUS at 12:36

## 2020-02-17 RX ADMIN — PROMETHAZINE HYDROCHLORIDE 25 MG: 25 INJECTION, SOLUTION INTRAMUSCULAR; INTRAVENOUS at 17:03

## 2020-02-17 RX ADMIN — LEVETIRACETAM 1000 MG: 500 TABLET ORAL at 21:57

## 2020-02-17 RX ADMIN — VANCOMYCIN HYDROCHLORIDE 1000 MG: 1 INJECTION, POWDER, LYOPHILIZED, FOR SOLUTION INTRAVENOUS at 20:30

## 2020-02-17 RX ADMIN — SODIUM CHLORIDE: 9 INJECTION, SOLUTION INTRAVENOUS at 23:30

## 2020-02-17 RX ADMIN — Medication 75 MCG/HR: at 03:21

## 2020-02-17 RX ADMIN — ONDANSETRON 4 MG: 2 INJECTION INTRAMUSCULAR; INTRAVENOUS at 15:40

## 2020-02-17 RX ADMIN — VANCOMYCIN HYDROCHLORIDE 1000 MG: 1 INJECTION, POWDER, LYOPHILIZED, FOR SOLUTION INTRAVENOUS at 03:56

## 2020-02-17 RX ADMIN — DEXMEDETOMIDINE 0.5 MCG/KG/HR: 100 INJECTION, SOLUTION, CONCENTRATE INTRAVENOUS at 01:19

## 2020-02-17 RX ADMIN — ONDANSETRON 4 MG: 2 INJECTION INTRAMUSCULAR; INTRAVENOUS at 09:38

## 2020-02-17 RX ADMIN — Medication 10 ML: at 09:38

## 2020-02-17 RX ADMIN — OXYCODONE HYDROCHLORIDE 5 MG: 5 SOLUTION ORAL at 03:56

## 2020-02-17 RX ADMIN — SODIUM CHLORIDE: 9 INJECTION, SOLUTION INTRAVENOUS at 15:36

## 2020-02-17 ASSESSMENT — PULMONARY FUNCTION TESTS
PIF_VALUE: 21
PIF_VALUE: 19
PIF_VALUE: 20
PIF_VALUE: 19
PIF_VALUE: 21
PIF_VALUE: 13
PIF_VALUE: 20

## 2020-02-17 ASSESSMENT — PAIN SCALES - GENERAL
PAINLEVEL_OUTOF10: 0

## 2020-02-17 NOTE — PROGRESS NOTES
Patient was suctioned orally and endotracheally prior to being extubated to 4 liters/min via nasal cannula. Breath Sounds post extubation were diminished. Stridor was not present post extubation. SPO2 was 100%.       Performed by  Isaias Fletcher
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
## 2020-02-17 NOTE — PLAN OF CARE
RN  Outcome: Met This Shift     Problem: Restraint Use - Nonviolent/Non-Self-Destructive Behavior:  Goal: Absence of restraint-related injury  Description  Absence of restraint-related injury  2/17/2020 0050 by Patrick Contreras RN  Outcome: Met This Shift  2/16/2020 2119 by Patrick Contreras RN  Outcome: Met This Shift  2/16/2020 1559 by Keith Campos RN  Outcome: Met This Shift  2/16/2020 1051 by Keith Campos RN  Outcome: Met This Shift     Problem: Confusion - Acute:  Goal: Absence of continued neurological deterioration signs and symptoms  Description  Absence of continued neurological deterioration signs and symptoms  2/17/2020 0050 by Patrick Contreras RN  Outcome: Met This Shift  2/16/2020 2119 by Patrick Contreras RN  Outcome: Met This Shift  2/16/2020 1559 by Keith Campos RN  Outcome: Met This Shift     Problem: Injury - Risk of, Physical Injury:  Goal: Will remain free from falls  Description  Will remain free from falls  2/17/2020 0050 by Patrick Contreras RN  Outcome: Met This Shift  2/16/2020 2119 by Patrick Contreras RN  Outcome: Met This Shift  2/16/2020 1559 by Keith Campos RN  Outcome: Met This Shift  2/16/2020 1051 by Keith Campos RN  Outcome: Met This Shift  Goal: Absence of physical injury  Description  Absence of physical injury  2/17/2020 0050 by Patrick Contreras RN  Outcome: Met This Shift  2/16/2020 2119 by Patrick Contreras RN  Outcome: Met This Shift  2/16/2020 1559 by Keith Campos RN  Outcome: Met This Shift  2/16/2020 1051 by Keith Campos RN  Outcome: Met This Shift     Problem: Mood - Altered:  Goal: Mood stable  Description  Mood stable  2/16/2020 2119 by Patrick Contreras RN  Outcome: Met This Shift  2/16/2020 1051 by Keith Campos RN  Outcome: Not Met This Shift  Goal: Absence of abusive behavior  Description  Absence of abusive behavior  2/16/2020 1559 by Keith Campos RN  Outcome: Met This Shift     Problem: Sensory Perception - Impaired:  Goal: Demonstrations of improved sensory functioning will
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
## 2020-02-17 NOTE — CARE COORDINATION
Met briefly with pt to discuss transition of care, but pt was falling asleep when talking to him. He stated that he lives in a 2 story home with his parents. His Pcp is Dr Patricia Bonilla. Pt fell asleep after that. Will return tomorrow after therapy evals are available.

## 2020-02-17 NOTE — FLOWSHEET NOTE
Will pull at lines and tubes when not restrained. Verbal redirection and education are ineffective at this time.  Bilateral soft wrist restraints continue

## 2020-02-17 NOTE — PLAN OF CARE
Problem: Falls - Risk of:  Goal: Will remain free from falls  Description  Will remain free from falls  2/16/2020 2119 by Sri Bravo RN  Outcome: Met This Shift  2/16/2020 1559 by Breana Waller RN  Outcome: Met This Shift  2/16/2020 1051 by Breana Waller RN  Outcome: Met This Shift  Goal: Absence of physical injury  Description  Absence of physical injury  2/16/2020 2119 by Sri Bravo RN  Outcome: Met This Shift  2/16/2020 1559 by Breana Waller RN  Outcome: Met This Shift  2/16/2020 1051 by Breana Waller RN  Outcome: Met This Shift     Problem: Pain:  Goal: Pain level will decrease  Description  Pain level will decrease  2/16/2020 2119 by Sri Bravo RN  Outcome: Met This Shift  2/16/2020 1559 by Breana Waller RN  Outcome: Met This Shift  2/16/2020 1051 by Breana Waller RN  Outcome: Met This Shift  Goal: Control of acute pain  Description  Control of acute pain  2/16/2020 2119 by Sri Bravo RN  Outcome: Met This Shift  2/16/2020 1559 by Breana Waller RN  Outcome: Met This Shift  2/16/2020 1051 by Breana Waller RN  Outcome: Met This Shift     Problem: Skin Integrity - Impaired:  Goal: Will show no infection signs and symptoms  Description  Will show no infection signs and symptoms  2/16/2020 2119 by Sri Bravo RN  Outcome: Met This Shift  2/16/2020 1559 by Breana Waller RN  Outcome: Met This Shift  2/16/2020 1051 by Breana Waller RN  Outcome: Met This Shift  Goal: Absence of new skin breakdown  Description  Absence of new skin breakdown  2/16/2020 2119 by Sri Bravo RN  Outcome: Met This Shift  2/16/2020 1559 by Breana Waller RN  Outcome: Met This Shift  2/16/2020 1051 by Breana Waller RN  Outcome: Met This Shift     Problem: Restraint Use - Nonviolent/Non-Self-Destructive Behavior:  Goal: Absence of restraint-related injury  Description  Absence of restraint-related injury  2/16/2020 2119 by Sri Bravo RN  Outcome: Met This Shift  2/16/2020 1559 by Breana Waller RN  Outcome:  Met This Shift  2/16/2020 1051 by Ron Smith RN  Outcome: Met This Shift     Problem: Confusion - Acute:  Goal: Absence of continued neurological deterioration signs and symptoms  Description  Absence of continued neurological deterioration signs and symptoms  2/16/2020 2119 by Shasha Sood RN  Outcome: Met This Shift  2/16/2020 1559 by Ron Smith RN  Outcome: Met This Shift  Goal: Mental status will be restored to baseline  Description  Mental status will be restored to baseline  2/16/2020 2119 by Shasha Sood RN  Outcome: Met This Shift  2/16/2020 1559 by Ron Smith RN  Outcome: Not Met This Shift  2/16/2020 1051 by Ron Smith RN  Outcome: Not Met This Shift     Problem: Injury - Risk of, Physical Injury:  Goal: Will remain free from falls  Description  Will remain free from falls  2/16/2020 2119 by Shasha Sood RN  Outcome: Met This Shift  2/16/2020 1559 by Ron Smith RN  Outcome: Met This Shift  2/16/2020 1051 by Ron Smith RN  Outcome: Met This Shift  Goal: Absence of physical injury  Description  Absence of physical injury  2/16/2020 2119 by Shasha Sood RN  Outcome: Met This Shift  2/16/2020 1559 by Ron Smith RN  Outcome: Met This Shift  2/16/2020 1051 by Ron Smith RN  Outcome: Met This Shift     Problem: Mood - Altered:  Goal: Mood stable  Description  Mood stable  2/16/2020 2119 by Shasha Sood RN  Outcome: Met This Shift  2/16/2020 1051 by Ron Smith RN  Outcome: Not Met This Shift  Goal: Absence of abusive behavior  Description  Absence of abusive behavior  2/16/2020 1559 by Ron Smith RN  Outcome: Met This Shift     Problem: Sensory Perception - Impaired:  Goal: Demonstrations of improved sensory functioning will increase  Description  Demonstrations of improved sensory functioning will increase  Outcome: Met This Shift

## 2020-02-17 NOTE — FLOWSHEET NOTE
Attempts to reach for lines and tubes when not restrained. Verbal redirection and education are ineffective at this time. Patient at high risk for self extubation.  Bilateral soft wrist restraints continue for safety

## 2020-02-17 NOTE — PROGRESS NOTES
Patient has unequal pupils. History of left retinal detachment and left eye surgery per patient's mother. (Left pupil normally larger than right)

## 2020-02-17 NOTE — PROGRESS NOTES
activities 45244 10 minutes  [] Therapeutic exercises 11265 - minutes  [] Neuromuscular reeducation 62069 - minutes     Abel Toney PT, DPT  [id number]

## 2020-02-17 NOTE — PLAN OF CARE
Problem: Falls - Risk of:  Goal: Will remain free from falls  Description  Will remain free from falls  Outcome: Met This Shift  Goal: Absence of physical injury  Description  Absence of physical injury  Outcome: Met This Shift     Problem: Pain:  Goal: Pain level will decrease  Description  Pain level will decrease  Outcome: Met This Shift  Goal: Control of acute pain  Description  Control of acute pain  Outcome: Met This Shift     Problem: Skin Integrity - Impaired:  Goal: Will show no infection signs and symptoms  Description  Will show no infection signs and symptoms  Outcome: Met This Shift  Goal: Absence of new skin breakdown  Description  Absence of new skin breakdown  Outcome: Met This Shift     Problem: Confusion - Acute:  Goal: Absence of continued neurological deterioration signs and symptoms  Description  Absence of continued neurological deterioration signs and symptoms  Outcome: Met This Shift  Goal: Mental status will be restored to baseline  Description  Mental status will be restored to baseline  Outcome: Met This Shift     Problem: Injury - Risk of, Physical Injury:  Goal: Will remain free from falls  Description  Will remain free from falls  Outcome: Met This Shift  Goal: Absence of physical injury  Description  Absence of physical injury  Outcome: Met This Shift

## 2020-02-17 NOTE — PROGRESS NOTES
Vision: WFL; reports no vision changes               Glasses:yes                                                   Hearing: WFL     RASS: 0  CAM-ICU: (NT) Delirium    UE Assessment:  Hand Dominance: Right [x]  Left []     ROM Strength   RUE  WFL 4/5   LUE WFL 4/5     Sensation: No c/o numbness or tingling in extremities   Tone: WNL   Edema: [facility]     Functional Assessment   Initial Eval Status  Date: 2/17 Treatment Status  Date: STG=LTG  5-14  days    Feeding NT  Due to nausea/emesis. IND  while seated up in chair to increase activity tolerance        Grooming Min A                        Mod I   while standing sink level requiring no assist for balance and demonstrating G tolerance      UB dressing/bathing Min A  Due to medical lines                        IND       LB dressing/bathing Min A  for standing balance; pt able to sit EOB to denae pants/socks with no LOB    Bathing- NT                        IND   using AE as needed for safe reach/ energy conservation       Toileting NT                        IND     Bed Mobility  Supine to sit: SBA    Sit to supine: SBA                        IND  in prep of ADL tasks & transfers   Functional Transfers Sit to stand: Min A    Stand to sit: Min A                        Mod I  sit<>stand/functional bathroom transfers using AD/DME as needed for balance and safety   Functional Mobility Mod A no device; unsteady reporting head feels like it's \"spinning\"                       Mod I   functional/bathroom mobility using AD as needed & demonstrating G safety     Balance Sitting:     Static:  SBA    Dynamic:SBA  Standing: Min A  IND dynamic sitting balance; Mod I dynamic standing balance  during ADL tasks & transfers   Endurance/Activity Tolerance   F tolerance with moderate activity. Pt sat> 5 min EOB to perform LB dressing. C/o nausea.   G   tolerance with moderate activity/self care routine   Visual/  Perceptual   WFL                       Vitals:   HR at

## 2020-02-18 LAB
ALBUMIN SERPL-MCNC: 3.6 G/DL (ref 3.5–5.2)
ALP BLD-CCNC: 61 U/L (ref 40–129)
ALT SERPL-CCNC: 10 U/L (ref 0–40)
ANION GAP SERPL CALCULATED.3IONS-SCNC: 12 MMOL/L (ref 7–16)
AST SERPL-CCNC: 28 U/L (ref 0–39)
BASOPHILS ABSOLUTE: 0.02 E9/L (ref 0–0.2)
BASOPHILS RELATIVE PERCENT: 0.2 % (ref 0–2)
BILIRUB SERPL-MCNC: 1.5 MG/DL (ref 0–1.2)
BUN BLDV-MCNC: 8 MG/DL (ref 6–20)
CALCIUM IONIZED: 1.26 MMOL/L (ref 1.15–1.33)
CALCIUM SERPL-MCNC: 8.9 MG/DL (ref 8.6–10.2)
CHLORIDE BLD-SCNC: 97 MMOL/L (ref 98–107)
CO2: 24 MMOL/L (ref 22–29)
CREAT SERPL-MCNC: 0.6 MG/DL (ref 0.7–1.2)
EOSINOPHILS ABSOLUTE: 0.06 E9/L (ref 0.05–0.5)
EOSINOPHILS RELATIVE PERCENT: 0.7 % (ref 0–6)
GFR AFRICAN AMERICAN: >60
GFR NON-AFRICAN AMERICAN: >60 ML/MIN/1.73
GLUCOSE BLD-MCNC: 118 MG/DL (ref 74–99)
HCT VFR BLD CALC: 36.6 % (ref 37–54)
HEMOGLOBIN: 12.2 G/DL (ref 12.5–16.5)
IMMATURE GRANULOCYTES #: 0.03 E9/L
IMMATURE GRANULOCYTES %: 0.4 % (ref 0–5)
LYMPHOCYTES ABSOLUTE: 0.76 E9/L (ref 1.5–4)
LYMPHOCYTES RELATIVE PERCENT: 9.1 % (ref 20–42)
MAGNESIUM: 2 MG/DL (ref 1.6–2.6)
MCH RBC QN AUTO: 28.8 PG (ref 26–35)
MCHC RBC AUTO-ENTMCNC: 33.3 % (ref 32–34.5)
MCV RBC AUTO: 86.3 FL (ref 80–99.9)
MONOCYTES ABSOLUTE: 0.62 E9/L (ref 0.1–0.95)
MONOCYTES RELATIVE PERCENT: 7.5 % (ref 2–12)
NEUTROPHILS ABSOLUTE: 6.82 E9/L (ref 1.8–7.3)
NEUTROPHILS RELATIVE PERCENT: 82.1 % (ref 43–80)
PDW BLD-RTO: 12.4 FL (ref 11.5–15)
PHOSPHORUS: 2.7 MG/DL (ref 2.5–4.5)
PLATELET # BLD: 141 E9/L (ref 130–450)
PMV BLD AUTO: 10.2 FL (ref 7–12)
POTASSIUM SERPL-SCNC: 3.6 MMOL/L (ref 3.5–5)
RBC # BLD: 4.24 E12/L (ref 3.8–5.8)
SODIUM BLD-SCNC: 133 MMOL/L (ref 132–146)
TOTAL PROTEIN: 6.3 G/DL (ref 6.4–8.3)
WBC # BLD: 8.3 E9/L (ref 4.5–11.5)

## 2020-02-18 PROCEDURE — 6370000000 HC RX 637 (ALT 250 FOR IP): Performed by: STUDENT IN AN ORGANIZED HEALTH CARE EDUCATION/TRAINING PROGRAM

## 2020-02-18 PROCEDURE — 6360000002 HC RX W HCPCS: Performed by: STUDENT IN AN ORGANIZED HEALTH CARE EDUCATION/TRAINING PROGRAM

## 2020-02-18 PROCEDURE — 80053 COMPREHEN METABOLIC PANEL: CPT

## 2020-02-18 PROCEDURE — 97530 THERAPEUTIC ACTIVITIES: CPT

## 2020-02-18 PROCEDURE — 2580000003 HC RX 258: Performed by: STUDENT IN AN ORGANIZED HEALTH CARE EDUCATION/TRAINING PROGRAM

## 2020-02-18 PROCEDURE — 84100 ASSAY OF PHOSPHORUS: CPT

## 2020-02-18 PROCEDURE — 83735 ASSAY OF MAGNESIUM: CPT

## 2020-02-18 PROCEDURE — 97535 SELF CARE MNGMENT TRAINING: CPT

## 2020-02-18 PROCEDURE — 82330 ASSAY OF CALCIUM: CPT

## 2020-02-18 PROCEDURE — 99233 SBSQ HOSP IP/OBS HIGH 50: CPT | Performed by: SURGERY

## 2020-02-18 PROCEDURE — 85025 COMPLETE CBC W/AUTO DIFF WBC: CPT

## 2020-02-18 PROCEDURE — 1200000000 HC SEMI PRIVATE

## 2020-02-18 PROCEDURE — 2700000000 HC OXYGEN THERAPY PER DAY

## 2020-02-18 PROCEDURE — 36415 COLL VENOUS BLD VENIPUNCTURE: CPT

## 2020-02-18 RX ORDER — OXYCODONE HYDROCHLORIDE 5 MG/1
5 TABLET ORAL EVERY 6 HOURS PRN
Status: DISCONTINUED | OUTPATIENT
Start: 2020-02-18 | End: 2020-02-20 | Stop reason: HOSPADM

## 2020-02-18 RX ORDER — PROMETHAZINE HYDROCHLORIDE 25 MG/ML
12.5 INJECTION, SOLUTION INTRAMUSCULAR; INTRAVENOUS EVERY 6 HOURS PRN
Status: DISCONTINUED | OUTPATIENT
Start: 2020-02-18 | End: 2020-02-20 | Stop reason: HOSPADM

## 2020-02-18 RX ADMIN — CEFEPIME HYDROCHLORIDE 2 G: 2 INJECTION, POWDER, FOR SOLUTION INTRAVENOUS at 19:15

## 2020-02-18 RX ADMIN — MUPIROCIN: 20 OINTMENT TOPICAL at 22:06

## 2020-02-18 RX ADMIN — ONDANSETRON 4 MG: 2 INJECTION INTRAMUSCULAR; INTRAVENOUS at 11:39

## 2020-02-18 RX ADMIN — VANCOMYCIN HYDROCHLORIDE 1000 MG: 1 INJECTION, POWDER, LYOPHILIZED, FOR SOLUTION INTRAVENOUS at 04:20

## 2020-02-18 RX ADMIN — LEVETIRACETAM 1000 MG: 500 TABLET ORAL at 20:41

## 2020-02-18 RX ADMIN — CEFEPIME HYDROCHLORIDE 2 G: 2 INJECTION, POWDER, FOR SOLUTION INTRAVENOUS at 04:42

## 2020-02-18 RX ADMIN — SODIUM CHLORIDE: 9 INJECTION, SOLUTION INTRAVENOUS at 11:46

## 2020-02-18 RX ADMIN — SODIUM CHLORIDE: 9 INJECTION, SOLUTION INTRAVENOUS at 15:10

## 2020-02-18 RX ADMIN — SODIUM CHLORIDE: 9 INJECTION, SOLUTION INTRAVENOUS at 08:54

## 2020-02-18 RX ADMIN — ENOXAPARIN SODIUM 30 MG: 30 INJECTION SUBCUTANEOUS at 12:29

## 2020-02-18 RX ADMIN — VANCOMYCIN HYDROCHLORIDE 1000 MG: 1 INJECTION, POWDER, LYOPHILIZED, FOR SOLUTION INTRAVENOUS at 20:41

## 2020-02-18 RX ADMIN — VANCOMYCIN HYDROCHLORIDE 1000 MG: 1 INJECTION, POWDER, LYOPHILIZED, FOR SOLUTION INTRAVENOUS at 11:36

## 2020-02-18 RX ADMIN — OXYCODONE 5 MG: 5 TABLET ORAL at 20:44

## 2020-02-18 RX ADMIN — LEVETIRACETAM 1000 MG: 500 TABLET ORAL at 08:47

## 2020-02-18 RX ADMIN — ENOXAPARIN SODIUM 30 MG: 30 INJECTION SUBCUTANEOUS at 20:41

## 2020-02-18 RX ADMIN — CEFEPIME HYDROCHLORIDE 2 G: 2 INJECTION, POWDER, FOR SOLUTION INTRAVENOUS at 11:00

## 2020-02-18 RX ADMIN — OXYCODONE 5 MG: 5 TABLET ORAL at 09:05

## 2020-02-18 RX ADMIN — MUPIROCIN: 20 OINTMENT TOPICAL at 08:48

## 2020-02-18 ASSESSMENT — PAIN DESCRIPTION - PAIN TYPE: TYPE: ACUTE PAIN

## 2020-02-18 ASSESSMENT — PAIN SCALES - GENERAL
PAINLEVEL_OUTOF10: 0
PAINLEVEL_OUTOF10: 7
PAINLEVEL_OUTOF10: 0
PAINLEVEL_OUTOF10: 8
PAINLEVEL_OUTOF10: 0
PAINLEVEL_OUTOF10: 0

## 2020-02-18 ASSESSMENT — PAIN DESCRIPTION - DESCRIPTORS: DESCRIPTORS: ACHING;DISCOMFORT;HEADACHE
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
## 2020-02-18 NOTE — PLAN OF CARE
Problem: Falls - Risk of:  Goal: Will remain free from falls  Description  Will remain free from falls  2/18/2020 1430 by Karie Lennox, RN  Outcome: Met This Shift     Problem: Falls - Risk of:  Goal: Absence of physical injury  Description  Absence of physical injury  2/18/2020 1430 by Karie Lennox, RN  Outcome: Met This Shift     Problem: Pain:  Goal: Pain level will decrease  Description  Pain level will decrease  2/18/2020 1430 by Karie Lennox, RN  Outcome: Met This Shift     Problem: Pain:  Goal: Control of acute pain  Description  Control of acute pain  2/18/2020 1430 by Karie Lennox, RN  Outcome: Met This Shift     Problem: Skin Integrity - Impaired:  Goal: Will show no infection signs and symptoms  Description  Will show no infection signs and symptoms  2/18/2020 1430 by Karie Lennox, RN  Outcome: Met This Shift     Problem: Skin Integrity - Impaired:  Goal: Absence of new skin breakdown  Description  Absence of new skin breakdown  2/18/2020 1430 by Karie Lennox, RN  Outcome: Met This Shift     Problem: Discharge Planning:  Goal: Ability to perform activities of daily living will improve  Description  Ability to perform activities of daily living will improve  Outcome: Met This Shift     Problem: Discharge Planning:  Goal: Participates in care planning  Description  Participates in care planning  Outcome: Met This Shift     Problem: Injury - Risk of, Physical Injury:  Goal: Will remain free from falls  Description  Will remain free from falls  2/18/2020 1430 by Karie Lennox, RN  Outcome: Met This Shift     Problem: Injury - Risk of, Physical Injury:  Goal: Absence of physical injury  Description  Absence of physical injury  2/18/2020 1430 by Karie Lennox, RN  Outcome: Met This Shift     Problem: Mood - Altered:  Goal: Mood stable  Description  Mood stable  Outcome: Met This Shift     Problem: Mood - Altered:  Goal: Absence of abusive behavior  Description  Absence of
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
## 2020-02-19 LAB
ALBUMIN SERPL-MCNC: 3.8 G/DL (ref 3.5–5.2)
ALP BLD-CCNC: 62 U/L (ref 40–129)
ALT SERPL-CCNC: 10 U/L (ref 0–40)
ANION GAP SERPL CALCULATED.3IONS-SCNC: 15 MMOL/L (ref 7–16)
AST SERPL-CCNC: 19 U/L (ref 0–39)
BASOPHILS ABSOLUTE: 0.05 E9/L (ref 0–0.2)
BASOPHILS RELATIVE PERCENT: 0.7 % (ref 0–2)
BILIRUB SERPL-MCNC: 1.1 MG/DL (ref 0–1.2)
BUN BLDV-MCNC: 9 MG/DL (ref 6–20)
CALCIUM SERPL-MCNC: 9.4 MG/DL (ref 8.6–10.2)
CHLORIDE BLD-SCNC: 97 MMOL/L (ref 98–107)
CO2: 23 MMOL/L (ref 22–29)
CREAT SERPL-MCNC: 0.7 MG/DL (ref 0.7–1.2)
EOSINOPHILS ABSOLUTE: 0.08 E9/L (ref 0.05–0.5)
EOSINOPHILS RELATIVE PERCENT: 1.1 % (ref 0–6)
GFR AFRICAN AMERICAN: >60
GFR NON-AFRICAN AMERICAN: >60 ML/MIN/1.73
GLUCOSE BLD-MCNC: 161 MG/DL (ref 74–99)
HCT VFR BLD CALC: 39.8 % (ref 37–54)
HEMOGLOBIN: 13.1 G/DL (ref 12.5–16.5)
IMMATURE GRANULOCYTES #: 0.03 E9/L
IMMATURE GRANULOCYTES %: 0.4 % (ref 0–5)
LYMPHOCYTES ABSOLUTE: 0.97 E9/L (ref 1.5–4)
LYMPHOCYTES RELATIVE PERCENT: 13 % (ref 20–42)
MCH RBC QN AUTO: 28.1 PG (ref 26–35)
MCHC RBC AUTO-ENTMCNC: 32.9 % (ref 32–34.5)
MCV RBC AUTO: 85.2 FL (ref 80–99.9)
MONOCYTES ABSOLUTE: 0.59 E9/L (ref 0.1–0.95)
MONOCYTES RELATIVE PERCENT: 7.9 % (ref 2–12)
NEUTROPHILS ABSOLUTE: 5.76 E9/L (ref 1.8–7.3)
NEUTROPHILS RELATIVE PERCENT: 76.9 % (ref 43–80)
PDW BLD-RTO: 12.1 FL (ref 11.5–15)
PLATELET # BLD: 175 E9/L (ref 130–450)
PMV BLD AUTO: 10.1 FL (ref 7–12)
POTASSIUM REFLEX MAGNESIUM: 3.8 MMOL/L (ref 3.5–5)
RBC # BLD: 4.67 E12/L (ref 3.8–5.8)
SODIUM BLD-SCNC: 135 MMOL/L (ref 132–146)
TOTAL PROTEIN: 6.9 G/DL (ref 6.4–8.3)
WBC # BLD: 7.5 E9/L (ref 4.5–11.5)

## 2020-02-19 PROCEDURE — 2580000003 HC RX 258: Performed by: STUDENT IN AN ORGANIZED HEALTH CARE EDUCATION/TRAINING PROGRAM

## 2020-02-19 PROCEDURE — 36415 COLL VENOUS BLD VENIPUNCTURE: CPT

## 2020-02-19 PROCEDURE — 1200000000 HC SEMI PRIVATE

## 2020-02-19 PROCEDURE — 6370000000 HC RX 637 (ALT 250 FOR IP): Performed by: SURGERY

## 2020-02-19 PROCEDURE — 6360000002 HC RX W HCPCS: Performed by: STUDENT IN AN ORGANIZED HEALTH CARE EDUCATION/TRAINING PROGRAM

## 2020-02-19 PROCEDURE — 85025 COMPLETE CBC W/AUTO DIFF WBC: CPT

## 2020-02-19 PROCEDURE — 99231 SBSQ HOSP IP/OBS SF/LOW 25: CPT | Performed by: SURGERY

## 2020-02-19 PROCEDURE — 6370000000 HC RX 637 (ALT 250 FOR IP): Performed by: STUDENT IN AN ORGANIZED HEALTH CARE EDUCATION/TRAINING PROGRAM

## 2020-02-19 PROCEDURE — 80053 COMPREHEN METABOLIC PANEL: CPT

## 2020-02-19 RX ORDER — OXYCODONE HYDROCHLORIDE 5 MG/1
5 TABLET ORAL EVERY 6 HOURS PRN
Qty: 21 TABLET | Refills: 0 | Status: SHIPPED | OUTPATIENT
Start: 2020-02-19 | End: 2020-02-22

## 2020-02-19 RX ORDER — LEVETIRACETAM 1000 MG/1
1000 TABLET ORAL 2 TIMES DAILY
Qty: 60 TABLET | Refills: 3 | Status: SHIPPED | OUTPATIENT
Start: 2020-02-19 | End: 2021-09-18

## 2020-02-19 RX ORDER — ACETAMINOPHEN 500 MG
500 TABLET ORAL
Status: DISCONTINUED | OUTPATIENT
Start: 2020-02-19 | End: 2020-02-20 | Stop reason: HOSPADM

## 2020-02-19 RX ORDER — SULFAMETHOXAZOLE AND TRIMETHOPRIM 800; 160 MG/1; MG/1
1 TABLET ORAL 2 TIMES DAILY
Qty: 10 TABLET | Refills: 0 | Status: SHIPPED | OUTPATIENT
Start: 2020-02-19 | End: 2020-02-22

## 2020-02-19 RX ORDER — AMOXICILLIN AND CLAVULANATE POTASSIUM 875; 125 MG/1; MG/1
1 TABLET, FILM COATED ORAL 2 TIMES DAILY
Qty: 6 TABLET | Refills: 0 | Status: SHIPPED | OUTPATIENT
Start: 2020-02-19 | End: 2020-02-22

## 2020-02-19 RX ORDER — SCOLOPAMINE TRANSDERMAL SYSTEM 1 MG/1
1 PATCH, EXTENDED RELEASE TRANSDERMAL
Qty: 3 PATCH | Refills: 1 | Status: SHIPPED | OUTPATIENT
Start: 2020-02-20 | End: 2021-09-18

## 2020-02-19 RX ADMIN — Medication 10 ML: at 09:41

## 2020-02-19 RX ADMIN — SODIUM CHLORIDE: 9 INJECTION, SOLUTION INTRAVENOUS at 23:40

## 2020-02-19 RX ADMIN — SODIUM CHLORIDE: 9 INJECTION, SOLUTION INTRAVENOUS at 17:54

## 2020-02-19 RX ADMIN — MUPIROCIN: 20 OINTMENT TOPICAL at 09:41

## 2020-02-19 RX ADMIN — VANCOMYCIN HYDROCHLORIDE 1000 MG: 1 INJECTION, POWDER, LYOPHILIZED, FOR SOLUTION INTRAVENOUS at 03:53

## 2020-02-19 RX ADMIN — ACETAMINOPHEN 500 MG: 500 TABLET ORAL at 21:39

## 2020-02-19 RX ADMIN — ENOXAPARIN SODIUM 30 MG: 30 INJECTION SUBCUTANEOUS at 21:39

## 2020-02-19 RX ADMIN — VANCOMYCIN HYDROCHLORIDE 1000 MG: 1 INJECTION, POWDER, LYOPHILIZED, FOR SOLUTION INTRAVENOUS at 16:33

## 2020-02-19 RX ADMIN — ONDANSETRON 4 MG: 2 INJECTION INTRAMUSCULAR; INTRAVENOUS at 16:31

## 2020-02-19 RX ADMIN — Medication 10 ML: at 21:41

## 2020-02-19 RX ADMIN — SODIUM CHLORIDE, PRESERVATIVE FREE 10 ML: 5 INJECTION INTRAVENOUS at 03:53

## 2020-02-19 RX ADMIN — ACETAMINOPHEN 500 MG: 500 TABLET ORAL at 09:26

## 2020-02-19 RX ADMIN — SODIUM CHLORIDE: 9 INJECTION, SOLUTION INTRAVENOUS at 09:40

## 2020-02-19 RX ADMIN — LEVETIRACETAM 1000 MG: 500 TABLET ORAL at 09:26

## 2020-02-19 RX ADMIN — LEVETIRACETAM 1000 MG: 500 TABLET ORAL at 21:38

## 2020-02-19 RX ADMIN — OXYCODONE 5 MG: 5 TABLET ORAL at 16:31

## 2020-02-19 RX ADMIN — CEFEPIME HYDROCHLORIDE 2 G: 2 INJECTION, POWDER, FOR SOLUTION INTRAVENOUS at 03:53

## 2020-02-19 RX ADMIN — OXYCODONE 5 MG: 5 TABLET ORAL at 04:09

## 2020-02-19 RX ADMIN — CEFEPIME HYDROCHLORIDE 2 G: 2 INJECTION, POWDER, FOR SOLUTION INTRAVENOUS at 12:05

## 2020-02-19 RX ADMIN — ENOXAPARIN SODIUM 30 MG: 30 INJECTION SUBCUTANEOUS at 09:26

## 2020-02-19 RX ADMIN — OXYCODONE 5 MG: 5 TABLET ORAL at 10:39

## 2020-02-19 RX ADMIN — VANCOMYCIN HYDROCHLORIDE 1000 MG: 1 INJECTION, POWDER, LYOPHILIZED, FOR SOLUTION INTRAVENOUS at 22:21

## 2020-02-19 ASSESSMENT — PAIN DESCRIPTION - ONSET: ONSET: ON-GOING

## 2020-02-19 ASSESSMENT — PAIN SCALES - GENERAL
PAINLEVEL_OUTOF10: 0
PAINLEVEL_OUTOF10: 0
PAINLEVEL_OUTOF10: 6
PAINLEVEL_OUTOF10: 8
PAINLEVEL_OUTOF10: 9
PAINLEVEL_OUTOF10: 8
PAINLEVEL_OUTOF10: 0

## 2020-02-19 ASSESSMENT — PAIN DESCRIPTION - FREQUENCY: FREQUENCY: CONTINUOUS

## 2020-02-19 ASSESSMENT — PAIN DESCRIPTION - DESCRIPTORS: DESCRIPTORS: ACHING;HEADACHE;POUNDING

## 2020-02-19 ASSESSMENT — PAIN DESCRIPTION - LOCATION: LOCATION: HEAD

## 2020-02-19 ASSESSMENT — PAIN DESCRIPTION - PAIN TYPE: TYPE: ACUTE PAIN
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
## 2020-02-20 VITALS
HEART RATE: 68 BPM | OXYGEN SATURATION: 94 % | DIASTOLIC BLOOD PRESSURE: 68 MMHG | TEMPERATURE: 97 F | SYSTOLIC BLOOD PRESSURE: 120 MMHG | RESPIRATION RATE: 18 BRPM | HEIGHT: 68 IN | BODY MASS INDEX: 21.45 KG/M2 | WEIGHT: 141.56 LBS

## 2020-02-20 LAB
ALBUMIN SERPL-MCNC: 3.7 G/DL (ref 3.5–5.2)
ALP BLD-CCNC: 61 U/L (ref 40–129)
ALT SERPL-CCNC: 8 U/L (ref 0–40)
ANION GAP SERPL CALCULATED.3IONS-SCNC: 16 MMOL/L (ref 7–16)
AST SERPL-CCNC: 12 U/L (ref 0–39)
BASOPHILS ABSOLUTE: 0.07 E9/L (ref 0–0.2)
BASOPHILS RELATIVE PERCENT: 0.8 % (ref 0–2)
BILIRUB SERPL-MCNC: 0.7 MG/DL (ref 0–1.2)
BUN BLDV-MCNC: 11 MG/DL (ref 6–20)
CALCIUM SERPL-MCNC: 9.4 MG/DL (ref 8.6–10.2)
CHLORIDE BLD-SCNC: 97 MMOL/L (ref 98–107)
CO2: 24 MMOL/L (ref 22–29)
CREAT SERPL-MCNC: 0.7 MG/DL (ref 0.7–1.2)
EOSINOPHILS ABSOLUTE: 0.2 E9/L (ref 0.05–0.5)
EOSINOPHILS RELATIVE PERCENT: 2.4 % (ref 0–6)
GFR AFRICAN AMERICAN: >60
GFR NON-AFRICAN AMERICAN: >60 ML/MIN/1.73
GLUCOSE BLD-MCNC: 160 MG/DL (ref 74–99)
HCT VFR BLD CALC: 40.7 % (ref 37–54)
HEMOGLOBIN: 13.7 G/DL (ref 12.5–16.5)
IMMATURE GRANULOCYTES #: 0.03 E9/L
IMMATURE GRANULOCYTES %: 0.4 % (ref 0–5)
LYMPHOCYTES ABSOLUTE: 1.32 E9/L (ref 1.5–4)
LYMPHOCYTES RELATIVE PERCENT: 15.9 % (ref 20–42)
MCH RBC QN AUTO: 28.6 PG (ref 26–35)
MCHC RBC AUTO-ENTMCNC: 33.7 % (ref 32–34.5)
MCV RBC AUTO: 85 FL (ref 80–99.9)
MONOCYTES ABSOLUTE: 0.77 E9/L (ref 0.1–0.95)
MONOCYTES RELATIVE PERCENT: 9.3 % (ref 2–12)
NEUTROPHILS ABSOLUTE: 5.9 E9/L (ref 1.8–7.3)
NEUTROPHILS RELATIVE PERCENT: 71.2 % (ref 43–80)
PDW BLD-RTO: 12.1 FL (ref 11.5–15)
PLATELET # BLD: 210 E9/L (ref 130–450)
PMV BLD AUTO: 9.9 FL (ref 7–12)
POTASSIUM REFLEX MAGNESIUM: 3.7 MMOL/L (ref 3.5–5)
RBC # BLD: 4.79 E12/L (ref 3.8–5.8)
SODIUM BLD-SCNC: 137 MMOL/L (ref 132–146)
TOTAL PROTEIN: 6.7 G/DL (ref 6.4–8.3)
WBC # BLD: 8.3 E9/L (ref 4.5–11.5)

## 2020-02-20 PROCEDURE — 6360000002 HC RX W HCPCS: Performed by: STUDENT IN AN ORGANIZED HEALTH CARE EDUCATION/TRAINING PROGRAM

## 2020-02-20 PROCEDURE — 6370000000 HC RX 637 (ALT 250 FOR IP): Performed by: NURSE PRACTITIONER

## 2020-02-20 PROCEDURE — 6370000000 HC RX 637 (ALT 250 FOR IP): Performed by: STUDENT IN AN ORGANIZED HEALTH CARE EDUCATION/TRAINING PROGRAM

## 2020-02-20 PROCEDURE — 6370000000 HC RX 637 (ALT 250 FOR IP): Performed by: SURGERY

## 2020-02-20 PROCEDURE — 80053 COMPREHEN METABOLIC PANEL: CPT

## 2020-02-20 PROCEDURE — 99231 SBSQ HOSP IP/OBS SF/LOW 25: CPT | Performed by: SURGERY

## 2020-02-20 PROCEDURE — 36415 COLL VENOUS BLD VENIPUNCTURE: CPT

## 2020-02-20 PROCEDURE — 97530 THERAPEUTIC ACTIVITIES: CPT

## 2020-02-20 PROCEDURE — 2580000003 HC RX 258: Performed by: STUDENT IN AN ORGANIZED HEALTH CARE EDUCATION/TRAINING PROGRAM

## 2020-02-20 PROCEDURE — 85025 COMPLETE CBC W/AUTO DIFF WBC: CPT

## 2020-02-20 RX ORDER — METHOCARBAMOL 750 MG/1
1500 TABLET, FILM COATED ORAL 4 TIMES DAILY
Status: DISCONTINUED | OUTPATIENT
Start: 2020-02-20 | End: 2020-02-20 | Stop reason: HOSPADM

## 2020-02-20 RX ADMIN — VANCOMYCIN HYDROCHLORIDE 1000 MG: 1 INJECTION, POWDER, LYOPHILIZED, FOR SOLUTION INTRAVENOUS at 04:15

## 2020-02-20 RX ADMIN — OXYCODONE 5 MG: 5 TABLET ORAL at 05:20

## 2020-02-20 RX ADMIN — CEFEPIME HYDROCHLORIDE 2 G: 2 INJECTION, POWDER, FOR SOLUTION INTRAVENOUS at 14:37

## 2020-02-20 RX ADMIN — ENOXAPARIN SODIUM 30 MG: 30 INJECTION SUBCUTANEOUS at 09:18

## 2020-02-20 RX ADMIN — LEVETIRACETAM 1000 MG: 500 TABLET ORAL at 09:19

## 2020-02-20 RX ADMIN — SODIUM CHLORIDE: 9 INJECTION, SOLUTION INTRAVENOUS at 16:05

## 2020-02-20 RX ADMIN — VANCOMYCIN HYDROCHLORIDE 1000 MG: 1 INJECTION, POWDER, LYOPHILIZED, FOR SOLUTION INTRAVENOUS at 11:45

## 2020-02-20 RX ADMIN — OXYCODONE 5 MG: 5 TABLET ORAL at 11:42

## 2020-02-20 RX ADMIN — OXYCODONE 5 MG: 5 TABLET ORAL at 17:44

## 2020-02-20 RX ADMIN — ACETAMINOPHEN 500 MG: 500 TABLET ORAL at 11:43

## 2020-02-20 RX ADMIN — METHOCARBAMOL TABLETS 1500 MG: 750 TABLET, COATED ORAL at 11:43

## 2020-02-20 RX ADMIN — SODIUM CHLORIDE: 9 INJECTION, SOLUTION INTRAVENOUS at 09:44

## 2020-02-20 RX ADMIN — MUPIROCIN: 20 OINTMENT TOPICAL at 09:19

## 2020-02-20 RX ADMIN — CEFEPIME HYDROCHLORIDE 2 G: 2 INJECTION, POWDER, FOR SOLUTION INTRAVENOUS at 05:14

## 2020-02-20 RX ADMIN — ACETAMINOPHEN 500 MG: 500 TABLET ORAL at 16:00

## 2020-02-20 RX ADMIN — ACETAMINOPHEN 500 MG: 500 TABLET ORAL at 05:20

## 2020-02-20 ASSESSMENT — PAIN SCALES - GENERAL
PAINLEVEL_OUTOF10: 6
PAINLEVEL_OUTOF10: 7
PAINLEVEL_OUTOF10: 9
PAINLEVEL_OUTOF10: 8
PAINLEVEL_OUTOF10: 7

## 2020-02-20 ASSESSMENT — PAIN DESCRIPTION - FREQUENCY: FREQUENCY: INTERMITTENT

## 2020-02-20 ASSESSMENT — PAIN DESCRIPTION - LOCATION: LOCATION: HEAD

## 2020-02-20 ASSESSMENT — PAIN DESCRIPTION - PAIN TYPE: TYPE: ACUTE PAIN

## 2020-02-20 ASSESSMENT — PAIN DESCRIPTION - DESCRIPTORS: DESCRIPTORS: ACHING;DISCOMFORT;HEADACHE

## 2020-02-20 ASSESSMENT — PAIN DESCRIPTION - ONSET: ONSET: ON-GOING

## 2020-02-20 NOTE — CARE COORDINATION
2/20/2020 social work transition of care planning  Sw/tucker received proof of Id from [facility] for pt to use as id at [employer]. Sw gave a copy of the form to pt and placed taxi voucher in soft chart. Sw sent rx down to employee pharmacy with face sheet. Sw notified [facility] of pt discharge today and updated pt of plan.   Electronically signed by ZELALEM Pabon on 2/20/2020 at 4:20 PM
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
## 2020-02-20 NOTE — CARE COORDINATION
2/20/2020 social work transition of care planning  Sw followed up with pt to discuss transition of care planning. Sw provided phone number for [employer] to pt. Pt still has no ID so he can go to [address] [place] [address]. Sw requested pt contact some friends or family for a place to stay at discharge. Sw discussed possible bunny, if discharged on iv atb (if ins would approve). Sw discussed options that will address drug use ([facility],pt was agreeable,if appropriate-Evon will review. Philipp will follow. Electronically signed by ZELALEM Rjaput on 2/20/2020 at 10:14 AM     Addendum: Philipp notified that pt will be discharging on po atb. Sw followed up with pt about transition of care planning. Pt had not reached out to [employer]. Sw strongly encouraged pt to reach out to [employer] or friends to assist with arrangements at discharge. Philipp will follow up later today.   Electronically signed by ZELALEM Rajput on 2/20/2020 at 12:30 PM
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
## 2020-02-21 NOTE — PROGRESS NOTES
CLINICAL PHARMACY NOTE: MEDS  [address] Select Patient?: No  Total # of Prescriptions Filled: 6   The following medications were delivered to the patient:   Oxycodone 5   levetiracetam 1000   Scopolamine 1mg patch   Mupirocin 2% ointment   Amoxicillin-pot 875-125     Sulfamethoxazole-trime 800-160    Total # of Interventions Completed: 3  Time Spent (min): 30    Additional Documentation:

## 2020-03-02 ENCOUNTER — HOSPITAL ENCOUNTER (EMERGENCY)
Age: 25
Discharge: HOME | End: 2020-03-02
Attending: EMERGENCY MEDICINE
Payer: MEDICAID

## 2020-03-02 ENCOUNTER — APPOINTMENT (OUTPATIENT)
Dept: CT IMAGING | Age: 25
End: 2020-03-02
Payer: MEDICAID

## 2020-03-02 VITALS
TEMPERATURE: 98.1 F | HEART RATE: 101 BPM | RESPIRATION RATE: 18 BRPM | DIASTOLIC BLOOD PRESSURE: 89 MMHG | WEIGHT: 130 LBS | BODY MASS INDEX: 20.36 KG/M2 | SYSTOLIC BLOOD PRESSURE: 129 MMHG | OXYGEN SATURATION: 99 %

## 2020-03-02 PROCEDURE — 70450 CT HEAD/BRAIN W/O DYE: CPT

## 2020-03-02 PROCEDURE — 6360000002 HC RX W HCPCS: Performed by: EMERGENCY MEDICINE

## 2020-03-02 PROCEDURE — 96372 THER/PROPH/DIAG INJ SC/IM: CPT

## 2020-03-02 PROCEDURE — 99284 EMERGENCY DEPT VISIT MOD MDM: CPT

## 2020-03-02 RX ORDER — KETOROLAC TROMETHAMINE 30 MG/ML
15 INJECTION, SOLUTION INTRAMUSCULAR; INTRAVENOUS ONCE
Status: COMPLETED | OUTPATIENT
Start: 2020-03-02 | End: 2020-03-02

## 2020-03-02 RX ADMIN — KETOROLAC TROMETHAMINE 15 MG: 30 INJECTION, SOLUTION INTRAMUSCULAR; INTRAVENOUS at 13:29

## 2020-03-02 ASSESSMENT — PAIN SCALES - GENERAL
PAINLEVEL_OUTOF10: 7
PAINLEVEL_OUTOF10: 8

## 2020-03-02 ASSESSMENT — PAIN DESCRIPTION - LOCATION: LOCATION: HEAD

## 2020-03-02 ASSESSMENT — PAIN DESCRIPTION - FREQUENCY: FREQUENCY: CONTINUOUS

## 2020-03-02 ASSESSMENT — PAIN DESCRIPTION - PAIN TYPE: TYPE: ACUTE PAIN

## 2020-03-02 NOTE — ED PROVIDER NOTES
Department of Emergency Medicine   ED  Provider Note  Admit Date/RoomTime: 3/2/2020 12:32 PM  ED Room: /          History of Present Illness:  3/2/20, Time: 2:01 PM  Chief Complaint   Patient presents with    Headache     Started 3 days ago with nausea. Pt had a recent skull fracture 2 weeks.  Suture / Staple Removal     Head stitches and staples on the head                Juan Dickey is a 30 y.o. male presenting to the ED for headache. Patient states he was a trauma patient about 3 weeks ago. He had a skull fracture with intracranial hemorrhage. He had staples placed, he is here to have them removed. Has not followed up. Denies any new injury or trauma. Planes of vague headache. Denies any nausea, vomit, blurred vision, paresthesias, or any other symptoms or complaints. Review of Systems:   Pertinent positives and negatives are stated within HPI, all other systems reviewed and are negative.        --------------------------------------------- PAST HISTORY ---------------------------------------------  Past Medical History:  has no past medical history on file. Past Surgical History:  has a past surgical history that includes [place] tooth extraction; Retinal detachment surgery; and Cataract removal.    Social History:  reports that he has been smoking cigarettes. He has been smoking about 25.00 packs per day. His smokeless tobacco use includes chew. He reports that he does not drink alcohol or use drugs. Family History: family history is not on file. . Unless otherwise noted, family history is non contributory    The patients home medications have been reviewed.     Allergies: Daytime severe cold & flu [phenylephrine-[id number]-[id number]]        ---------------------------------------------------PHYSICAL EXAM--------------------------------------    Constitutional/General: Alert and oriented x3  Head: Normocephalic with staples over the right side of the head  Eyes: PERRL, EOMI, sclera non icteric  Mouth: Oropharynx clear, handling secretions, no trismus, no asymmetry of the posterior oropharynx or uvular edema  Neck: Supple, full ROM, no stridor, no meningeal signs  Respiratory: Lungs clear to auscultation bilaterally, no wheezes, rales, or rhonchi. Not in respiratory distress  Cardiovascular:  Regular rate. Regular rhythm. 2+ distal pulses. Equal extremity pulses. Chest: No chest wall tenderness  GI:  Abdomen Soft, Non tender, Non distended. No rebound, guarding, or rigidity. No pulsatile masses. Musculoskeletal: Moves all extremities x 4. Warm and well perfused, no clubbing, cyanosis, or edema. Capillary refill <3 seconds  Integument: skin warm and dry. No rashes. Neurologic: GCS 15, no focal deficits, symmetric strength 5/5 in the upper and lower extremities bilaterally  Psychiatric: Normal Affect          -------------------------------------------------- RESULTS -------------------------------------------------  I have personally reviewed all laboratory and imaging results for this patient. Results are listed below. LABS: (Lab results interpreted by me)  No results found for this visit on 03/02/20.,       RADIOLOGY:  Interpreted by Radiologist unless otherwise specified  CT Head WO Contrast   Final Result         1. Nondisplaced right calvarial fracture. 2. No intracranial abnormality seen. EKG Interpretation  Interpreted by emergency department physician, Dr. Rere Jung           ------------------------- NURSING NOTES AND VITALS REVIEWED ---------------------------   The nursing notes within the ED encounter and vital signs as below have been reviewed by myself  /89   Pulse 101   Temp 98.1 °F (36.7 °C) (Temporal)   Resp 18   Wt 130 lb (59 kg)   SpO2 99%   BMI 20.36 kg/m²     Oxygen Saturation Interpretation: Normal    The patients available past medical records and past encounters were reviewed.         ------------------------------ ED COURSE/MEDICAL DECISION MAKING----------------------  Medications   ketorolac (TORADOL) injection 15 mg (15 mg Intramuscular Given 3/2/20 0442)             Medical Decision Making:    Imaging reviewed. Discussed with Dr. Nini Brownlee, he agrees with removing the sutures having the patient follow-up. Sutures and staples removed. Patient is to follow-up with neuorsurgery    Counseling: The emergency provider has spoken with the patient and discussed todays results, in addition to providing specific details for the plan of care and counseling regarding the diagnosis and prognosis. Questions are answered at this time and they are agreeable with the plan.       --------------------------------- IMPRESSION AND DISPOSITION ---------------------------------    IMPRESSION  1. Nonintractable headache, unspecified chronicity pattern, unspecified headache type        DISPOSITION  Disposition: Discharge to home  Patient condition is stable        NOTE: This report was transcribed using voice recognition software.  Every effort was made to ensure accuracy; however, inadvertent computerized transcription errors may be present        Sharona Vargas MD  03/02/20 9802

## 2020-03-08 ENCOUNTER — HOSPITAL ENCOUNTER (EMERGENCY)
Age: 25
Discharge: HOME | End: 2020-03-08
Payer: MEDICAID

## 2020-03-08 VITALS
OXYGEN SATURATION: 92 % | RESPIRATION RATE: 16 BRPM | SYSTOLIC BLOOD PRESSURE: 135 MMHG | TEMPERATURE: 97.9 F | DIASTOLIC BLOOD PRESSURE: 79 MMHG | HEART RATE: 92 BPM

## 2020-03-08 PROCEDURE — 99282 EMERGENCY DEPT VISIT SF MDM: CPT

## 2020-03-08 PROCEDURE — 6370000000 HC RX 637 (ALT 250 FOR IP): Performed by: NURSE PRACTITIONER

## 2020-03-08 RX ORDER — IBUPROFEN 600 MG/1
600 TABLET ORAL EVERY 6 HOURS PRN
Qty: 28 TABLET | Refills: 0 | Status: SHIPPED | OUTPATIENT
Start: 2020-03-08 | End: 2021-09-18

## 2020-03-08 RX ORDER — DIAPER,BRIEF,INFANT-TODD,DISP
EACH MISCELLANEOUS ONCE
Status: COMPLETED | OUTPATIENT
Start: 2020-03-08 | End: 2020-03-08

## 2020-03-08 RX ORDER — CEPHALEXIN 500 MG/1
500 CAPSULE ORAL 4 TIMES DAILY
Qty: 28 CAPSULE | Refills: 0 | Status: SHIPPED | OUTPATIENT
Start: 2020-03-08 | End: 2020-03-15

## 2020-03-08 RX ORDER — IBUPROFEN 400 MG/1
400 TABLET ORAL ONCE
Status: COMPLETED | OUTPATIENT
Start: 2020-03-08 | End: 2020-03-08

## 2020-03-08 RX ORDER — CEPHALEXIN 500 MG/1
500 CAPSULE ORAL ONCE
Status: COMPLETED | OUTPATIENT
Start: 2020-03-08 | End: 2020-03-08

## 2020-03-08 RX ADMIN — BACITRACIN ZINC: 500 OINTMENT TOPICAL at 10:08

## 2020-03-08 RX ADMIN — CEPHALEXIN 500 MG: 500 CAPSULE ORAL at 10:07

## 2020-03-08 RX ADMIN — IBUPROFEN 400 MG: 400 TABLET, FILM COATED ORAL at 10:07

## 2020-03-08 ASSESSMENT — PAIN SCALES - GENERAL: PAINLEVEL_OUTOF10: 6

## 2020-03-08 NOTE — ED PROVIDER NOTES
persistent nature. ROS    Pertinent positives and negatives are stated within HPI, all other systems reviewed and are negative. Past Surgical History:   Procedure Laterality Date    CATARACT REMOVAL      RETINAL DETACHMENT SURGERY      WISDOM TOOTH EXTRACTION     Social History:  reports that he has been smoking cigarettes. He has been smoking about 25.00 packs per day. His smokeless tobacco use includes chew. He reports that he does not drink alcohol or use drugs. Family History: family history is not on file. Allergies: Daytime severe cold & flu [phenylephrine-dm-gg-apap]    Physical Exam           ED Triage Vitals   BP Temp Temp src Pulse Resp SpO2 Height Weight   03/08/20 0953 03/08/20 0953 -- 03/08/20 0928 03/08/20 0953 03/08/20 0928 -- --   135/79 97.9 °F (36.6 °C)  110 16 98 %       Oxygen Saturation Interpretation: Normal.    Constitutional:  Alert, development consistent with age. HEENT:  NC/NT. Non-reactive left pupil 4 mm and right pupil 4 mm and reactive. EOMI and without any painful movements or photophobia on penlight exam.  There is no scleral injection, lid swelling or periorbital edema or erythema bilaterally. Inferior to the left eye, there is some old ecchymosis with an about 2 cm closed healing incision with mild erythema and localized mild edema. There is no abscess or fluctuance nor is there any spontaneous drainage to palpation. No open wound. No pain upon palpation of the orbit or zygoma. TMs and canals clear bilaterally. Oropharynx pink and moist without hypertrophy or exudate. No abnormal dentition or abscess. Airway patent. Neck:  Supple. No lymphadenopathy. No midline vertebral tenderness. Respiratory:  Clear to auscultation and breath sounds equal.  No increased work of breathing. CV:  Regular rate and rhythm. No resting tachycardia on exam or murmur.   Skin:  Skin turgor Normal.              Skin overall pink warm and dry otherwise than wound as above under HEENT.  Lymphatics: No lymphangitis or adenopathy noted. Neurological:  Orientation age-appropriate unless noted elseware. Motor functions intact. Lab / Imaging Results   (All laboratory and radiology results have been personally reviewed by myself)  Labs:  No results found for this visit on 03/08/20. Imaging: All Radiology results interpreted by Radiologist unless otherwise noted. No orders to display       ED Course / Medical Decision Making     Medications   cephALEXin (KEFLEX) capsule 500 mg (500 mg Oral Given 3/8/20 1007)   ibuprofen (ADVIL;MOTRIN) tablet 400 mg (400 mg Oral Given 3/8/20 1007)   bacitracin zinc ointment ( Topical Given 3/8/20 1008)        Consults:   None    Procedures:   none    MDM:   No focal neurologic deficit, persistent headaches or symptoms requiring repeat diagnostic imaging. Patient did have a recent CT of the brain on March 2 which was negative for intracranial abnormality and confirms a nondisplaced right calvarial fracture. His incision has swelling and erythema consistent with cellulitis however there is no fluctuance or abscess requiring incision and drainage. There is no periorbital cellulitis or eye involvement at the time of exam.  Plan is for skin care as discussed, Keflex which he verbalizes ability to be compliant with 4 time a day dosing, Bactroban and follow-up with the trauma clinic. He was not aware of the location of the trauma clinic and it is on [place] way [place] therefore he will arrange a follow-up appointment on Monday. He is aware signs and symptoms to watch for and return to ED for any new or worsening symptoms. Patient departed in stable condition. Counseling: The emergency provider has spoken with the patient and discussed todays results, in addition to providing specific details for the plan of care and counseling regarding the diagnosis and prognosis.   Questions are answered at this time and they are agreeable with the

## 2021-03-16 ENCOUNTER — HOSPITAL ENCOUNTER (EMERGENCY)
Age: 26
Discharge: HOME | End: 2021-03-16
Payer: COMMERCIAL

## 2021-03-16 ENCOUNTER — APPOINTMENT (OUTPATIENT)
Dept: CT IMAGING | Age: 26
End: 2021-03-16
Payer: COMMERCIAL

## 2021-03-16 VITALS
OXYGEN SATURATION: 97 % | SYSTOLIC BLOOD PRESSURE: 136 MMHG | RESPIRATION RATE: 18 BRPM | DIASTOLIC BLOOD PRESSURE: 83 MMHG | TEMPERATURE: 98.1 F | HEART RATE: 98 BPM

## 2021-03-16 DIAGNOSIS — R51.9 NONINTRACTABLE HEADACHE, UNSPECIFIED CHRONICITY PATTERN, UNSPECIFIED HEADACHE TYPE: Primary | ICD-10-CM

## 2021-03-16 LAB
ANION GAP SERPL CALCULATED.3IONS-SCNC: 11 MMOL/L (ref 7–16)
BASOPHILS ABSOLUTE: 0.04 E9/L (ref 0–0.2)
BASOPHILS RELATIVE PERCENT: 0.5 % (ref 0–2)
BUN BLDV-MCNC: 18 MG/DL (ref 6–20)
CALCIUM SERPL-MCNC: 9.4 MG/DL (ref 8.6–10.2)
CHLORIDE BLD-SCNC: 105 MMOL/L (ref 98–107)
CO2: 23 MMOL/L (ref 22–29)
CREAT SERPL-MCNC: 0.9 MG/DL (ref 0.7–1.2)
EOSINOPHILS ABSOLUTE: 0.15 E9/L (ref 0.05–0.5)
EOSINOPHILS RELATIVE PERCENT: 1.8 % (ref 0–6)
GFR AFRICAN AMERICAN: >60
GFR NON-AFRICAN AMERICAN: >60 ML/MIN/1.73
GLUCOSE BLD-MCNC: 94 MG/DL (ref 74–99)
HCT VFR BLD CALC: 47.8 % (ref 37–54)
HEMOGLOBIN: 15.8 G/DL (ref 12.5–16.5)
IMMATURE GRANULOCYTES #: 0.03 E9/L
IMMATURE GRANULOCYTES %: 0.4 % (ref 0–5)
LYMPHOCYTES ABSOLUTE: 2.12 E9/L (ref 1.5–4)
LYMPHOCYTES RELATIVE PERCENT: 25.5 % (ref 20–42)
MCH RBC QN AUTO: 29.9 PG (ref 26–35)
MCHC RBC AUTO-ENTMCNC: 33.1 % (ref 32–34.5)
MCV RBC AUTO: 90.5 FL (ref 80–99.9)
MONOCYTES ABSOLUTE: 0.54 E9/L (ref 0.1–0.95)
MONOCYTES RELATIVE PERCENT: 6.5 % (ref 2–12)
NEUTROPHILS ABSOLUTE: 5.44 E9/L (ref 1.8–7.3)
NEUTROPHILS RELATIVE PERCENT: 65.3 % (ref 43–80)
PDW BLD-RTO: 12.3 FL (ref 11.5–15)
PLATELET # BLD: 222 E9/L (ref 130–450)
PMV BLD AUTO: 9.7 FL (ref 7–12)
POTASSIUM SERPL-SCNC: 4.1 MMOL/L (ref 3.5–5)
RBC # BLD: 5.28 E12/L (ref 3.8–5.8)
SODIUM BLD-SCNC: 139 MMOL/L (ref 132–146)
WBC # BLD: 8.3 E9/L (ref 4.5–11.5)

## 2021-03-16 PROCEDURE — 80048 BASIC METABOLIC PNL TOTAL CA: CPT

## 2021-03-16 PROCEDURE — 70450 CT HEAD/BRAIN W/O DYE: CPT

## 2021-03-16 PROCEDURE — 96372 THER/PROPH/DIAG INJ SC/IM: CPT

## 2021-03-16 PROCEDURE — 6360000002 HC RX W HCPCS: Performed by: PHYSICIAN ASSISTANT

## 2021-03-16 PROCEDURE — 6370000000 HC RX 637 (ALT 250 FOR IP): Performed by: PHYSICIAN ASSISTANT

## 2021-03-16 PROCEDURE — 85025 COMPLETE CBC W/AUTO DIFF WBC: CPT

## 2021-03-16 PROCEDURE — 99284 EMERGENCY DEPT VISIT MOD MDM: CPT

## 2021-03-16 RX ORDER — BUTALBITAL, ASPIRIN, AND CAFFEINE 325; 50; 40 MG/1; MG/1; MG/1
1 CAPSULE ORAL EVERY 4 HOURS PRN
Qty: 6 CAPSULE | Refills: 0 | Status: SHIPPED | OUTPATIENT
Start: 2021-03-16 | End: 2021-09-18

## 2021-03-16 RX ORDER — METOCLOPRAMIDE 5 MG/1
10 TABLET ORAL ONCE
Status: COMPLETED | OUTPATIENT
Start: 2021-03-16 | End: 2021-03-16

## 2021-03-16 RX ORDER — DIPHENHYDRAMINE HCL 25 MG
25 TABLET ORAL ONCE
Status: COMPLETED | OUTPATIENT
Start: 2021-03-16 | End: 2021-03-16

## 2021-03-16 RX ORDER — KETOROLAC TROMETHAMINE 30 MG/ML
30 INJECTION, SOLUTION INTRAMUSCULAR; INTRAVENOUS ONCE
Status: COMPLETED | OUTPATIENT
Start: 2021-03-16 | End: 2021-03-16

## 2021-03-16 RX ORDER — ONDANSETRON 4 MG/1
4 TABLET, ORALLY DISINTEGRATING ORAL EVERY 8 HOURS PRN
Qty: 10 TABLET | Refills: 0 | Status: SHIPPED | OUTPATIENT
Start: 2021-03-16 | End: 2021-09-18

## 2021-03-16 RX ADMIN — METOCLOPRAMIDE 10 MG: 5 TABLET ORAL at 20:04

## 2021-03-16 RX ADMIN — KETOROLAC TROMETHAMINE 30 MG: 30 INJECTION, SOLUTION INTRAMUSCULAR; INTRAVENOUS at 20:04

## 2021-03-16 RX ADMIN — DIPHENHYDRAMINE HYDROCHLORIDE 25 MG: 25 TABLET ORAL at 20:04

## 2021-03-16 ASSESSMENT — PAIN SCALES - GENERAL: PAINLEVEL_OUTOF10: 6

## 2021-03-16 NOTE — ED PROVIDER NOTES
[facility]  HPI:  3/16/21, Time: 7:50 PM EDT         Flynn Khan is a 30 y.o. male presenting to the ED for Headache , beginning 3 days  ago. The complaint has been persistent, moderate in severity, and worsened by light . patient comes in with complaint of chronic headache generalized. With sensitivity to light associated nausea. He has history of multiple head injuries in the past and has chronic headaches. States yesterday he felt dizzy things felt \"wavy \". He states the dizziness has resolved. Denies any numbness tingling weakness of his extremities. He has chronic blurring of vision which is unchanged. He has history of retinal detachment repair of his left eye with chronic slightly dilated pupil. He denies any recent illness no runny nose congestion sore throat cough no fever chills. Review of Systems:   A complete review of systems was performed and pertinent positives and negatives are stated within HPI, all other systems reviewed and are negative.          --------------------------------------------- PAST HISTORY ---------------------------------------------  Past Medical History:  has no past medical history on file. Past Surgical History:  has a past surgical history that includes [place] tooth extraction; Retinal detachment surgery; and Cataract removal.    Social History:  reports that he has been smoking cigarettes. He has been smoking about 25.00 packs per day. His smokeless tobacco use includes chew. He reports that he does not drink alcohol or use drugs. Family History: family history is not on file. The patients home medications have been reviewed.     Allergies: Daytime severe cold & flu [phenylephrine-dm-gg-apap]    -------------------------------------------------- RESULTS -------------------------------------------------  All laboratory and radiology results have been personally reviewed by myself   LABS:  Results for orders placed or performed during the hospital encounter of 03/16/21   CBC Auto Differential   Result Value Ref Range    WBC 8.3 4.5 - 11.5 E9/L    RBC 5.28 3.80 - 5.80 E12/L    Hemoglobin 15.8 12.5 - 16.5 g/dL    Hematocrit 47.8 37.0 - 54.0 %    MCV 90.5 80.0 - 99.9 fL    MCH 29.9 26.0 - 35.0 pg    MCHC 33.1 32.0 - 34.5 %    RDW 12.3 11.5 - 15.0 fL    Platelets 167 741 - 950 E9/L    MPV 9.7 7.0 - 12.0 fL    Neutrophils % 65.3 43.0 - 80.0 %    Immature Granulocytes % 0.4 0.0 - 5.0 %    Lymphocytes % 25.5 20.0 - 42.0 %    Monocytes % 6.5 2.0 - 12.0 %    Eosinophils % 1.8 0.0 - 6.0 %    Basophils % 0.5 0.0 - 2.0 %    Neutrophils Absolute 5.44 1.80 - 7.30 E9/L    Immature Granulocytes # 0.03 E9/L    Lymphocytes Absolute 2.12 1.50 - 4.00 E9/L    Monocytes Absolute 0.54 0.10 - 0.95 E9/L    Eosinophils Absolute 0.15 0.05 - 0.50 E9/L    Basophils Absolute 0.04 0.00 - 0.20 C7/O   Basic Metabolic Panel   Result Value Ref Range    Sodium 139 132 - 146 mmol/L    Potassium 4.1 3.5 - 5.0 mmol/L    Chloride 105 98 - 107 mmol/L    CO2 23 22 - 29 mmol/L    Anion Gap 11 7 - 16 mmol/L    Glucose 94 74 - 99 mg/dL    BUN 18 6 - 20 mg/dL    CREATININE 0.9 0.7 - 1.2 mg/dL    GFR Non-African American >60 >=60 mL/min/1.73    GFR African American >60     Calcium 9.4 8.6 - 10.2 mg/dL       RADIOLOGY:  Interpreted by Radiologist.  CT HEAD WO CONTRAST   Final Result   No acute intracranial abnormality.             ------------------------- NURSING NOTES AND VITALS REVIEWED ---------------------------   The nursing notes within the ED encounter and vital signs as below have been reviewed.    BP (!) 138/90   Pulse 107   Temp 97.8 °F (36.6 °C)   Resp 16   SpO2 94%   Oxygen Saturation Interpretation: Normal      ---------------------------------------------------PHYSICAL EXAM--------------------------------------      Constitutional/General: Alert and oriented x3, well appearing, non toxic in NAD  Head: Normocephalic and atraumatic  Eyes: PERRL, EOMI left pupil 1 to 2 mm larger than the right patient states this is chronic  Mouth: Oropharynx clear, handling secretions, no trismus  Neck: Supple, full ROM, no vertebral tenderness negative Brudzinski's  Pulmonary: Lungs clear to auscultation bilaterally, no wheezes, rales, or rhonchi. Not in respiratory distress  Cardiovascular:  Regular rate and rhythm, no murmurs, gallops, or rubs. 2+ distal pulses  Abdomen: Soft, non tender, non distended,   Extremities: Moves all extremities x 4. Warm and well perfused  Skin: warm and dry without rash  Neurologic: GCS 15,  Psych: Normal Affect      ------------------------------ ED COURSE/MEDICAL DECISION MAKING----------------------  Medications   ketorolac (TORADOL) injection 30 mg (30 mg Intramuscular Given 3/16/21 2004)   metoclopramide (REGLAN) tablet 10 mg (10 mg Oral Given 3/16/21 2004)   diphenhydrAMINE (BENADRYL) tablet 25 mg (25 mg Oral Given 3/16/21 2004)         ED COURSE:   Patient was updated on lab findings and CT.    2100 patient states headache has resolved. Patient visual acuity was reviewed with the patient he states this is normal vision for him. Medical Decision Making:    Patient came in with complaint of headache with dizziness yesterday that resolved. Headache persisted. CT head no intracranial bleeds. Normal labs. No  meningeal signs. Headache resolved after Toradol Reglan Benadryl. He has history of chronic headaches secondary to multiple head injuries. Patient has chronic visual loss and pupil abnormality from a previous retinal detachment surgery. Counseling: The emergency provider has spoken with the patient and discussed todays results, in addition to providing specific details for the plan of care and counseling regarding the diagnosis and prognosis. Questions are answered at this time and they are agreeable with the plan.      --------------------------------- IMPRESSION AND DISPOSITION ---------------------------------    IMPRESSION  1.  Nonintractable headache, unspecified chronicity pattern, unspecified headache type        DISPOSITION  Disposition: Discharge to home  Patient condition is good      NOTE: This report was transcribed using voice recognition software.  Every effort was made to ensure accuracy; however, inadvertent computerized transcription errors may be present     Chad Enciso  03/16/21 7915

## 2021-03-16 NOTE — ED TRIAGE NOTES
FIRST PROVIDER CONTACT ASSESSMENT NOTE      Department of Emergency Medicine   3/16/21  2:50 PM EDT    Chief Complaint: No chief complaint on file. History of Present Illness:    Radha Owusu is a 30 y.o. male who presents to the ED by private car for HA and dizziness. Blurred vision yesterday. H/o skull fracture. Focused Screening Exam:  Constitutional:  Alert, appears stated age and is in no distress.       *ALLERGIES*     Daytime severe cold & flu [phenylephrine-dm-gg-apap]     ED Triage Vitals [03/16/21 1449]   BP Temp Temp src Pulse Resp SpO2 Height Weight   -- 97.8 °F (36.6 °C) -- 107 -- 94 % -- --        Initial Plan of Care:  Initiate Treatment-Testing, Proceed toTreatment Area When Bed Available for ED Attending/MLP to Continue Care    -----------------END OF FIRST PROVIDER CONTACT ASSESSMENT NOTE--------------  Electronically signed by Funmilayo Castellanos PA-C   DD: 3/16/21

## 2021-03-17 NOTE — ED NOTES
Visual acuity test completed patient states he should be wearing glasses but doesn't 20/50 in right eye and could only read line 1 in the left eye.  Patient states he has torn muscles in his eyes and a cataract in the left eye     Cristela Saravia RN  03/16/21 2035

## 2021-09-18 ENCOUNTER — HOSPITAL ENCOUNTER (EMERGENCY)
Age: 26
Discharge: HOME | End: 2021-09-18
Payer: COMMERCIAL

## 2021-09-18 VITALS
RESPIRATION RATE: 16 BRPM | OXYGEN SATURATION: 98 % | TEMPERATURE: 98 F | HEART RATE: 78 BPM | DIASTOLIC BLOOD PRESSURE: 79 MMHG | SYSTOLIC BLOOD PRESSURE: 137 MMHG

## 2021-09-18 DIAGNOSIS — H66.90 ACUTE OTITIS MEDIA, UNSPECIFIED OTITIS MEDIA TYPE: Primary | ICD-10-CM

## 2021-09-18 DIAGNOSIS — S09.91XA INJURY OF EXTERNAL AUDITORY CANAL, INITIAL ENCOUNTER: ICD-10-CM

## 2021-09-18 PROCEDURE — 99282 EMERGENCY DEPT VISIT SF MDM: CPT

## 2021-09-18 RX ORDER — AMOXICILLIN 500 MG/1
500 CAPSULE ORAL 3 TIMES DAILY
Qty: 21 CAPSULE | Refills: 0 | Status: SHIPPED | OUTPATIENT
Start: 2021-09-18 | End: 2021-09-25

## 2021-09-18 NOTE — ED PROVIDER NOTES
[address]  Department of Emergency Medicine   ED  Encounter Note  Admit Date/RoomTime: 2021  3:37 PM  ED Room: [facility]    NAME: Dillon Zepeda  : 1995  MRN: [id number]     Chief Complaint:  Ear Drainage (pt was at work and noticed blood dripping from his left ear. pt denies any trauma )    History of Present Illness        Dillon Zepeda is a 27 y.o. old male who presenting to the emergency department with complaint of sudden onset left ear drainage  and left ear pain. Symptoms began 1 day ago and have been persistent since that time. Patient denies chills, dyspnea, fever, nasal congestion, sneezing, sore throat or sweats. His symptoms are relieved by nothing. He has recent history of nothing pertinent including no trauma. He noticed blood from the ear today. 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 He does however have a slight earache on the left side. .     ROS   Pertinent positives and negatives are stated within HPI, all other systems reviewed and are negative. Past Medical History:  has no past medical history on file. Surgical History:  has a past surgical history that includes [place] tooth extraction; Retinal detachment surgery; and Cataract removal.    Social History:  reports that he has been smoking cigarettes. He has been smoking about 25.00 packs per day. His smokeless tobacco use includes chew. He reports that he does not drink alcohol and does not use drugs. Family History: family history is not on file.      Allergies: Daytime severe cold & flu [phenylephrine-dm-gg-apap] and Bee venom    Physical Exam   Oxygen Saturation Interpretation: Normal.        ED Triage Vitals   BP Temp Temp src Pulse Resp SpO2 Height Weight   21 1535 21 1535 -- 21 1523 21 1535 21 1523 -- --   137/79 98 °F (36.7 °C)  73 16 94 % Constitutional:  Alert, development consistent with age. Ears:  External Ears: Bilateral normal.                 TM's & External Canals:  Right TM and canal normal.  Left canal shoes presence of blood and after removing with cotton tipped applicator there is evidence of small abrasion like injury to inferior aspect at approx 6 O'clock position. TM is mildly erythematous w/o perf. Slight retraction and loss of light reflex. .  Nose:   There is no abnormalities present  Throat:  Pharynx without injection, exudate, or tonsillar hypertrophy. Airway patient. Neck:  Normal ROM. Supple. Respiratory:  Clear to auscultation and breath sounds equal.    CV: Regular rate and rhythm, normal heart sounds, without pathological murmurs, ectopy, gallops, or rubs. Skin:  No rashes, erythema present, unless noted elsewhere. Lymphatic: No lymphangitis or adenopathy noted. Neurological:  Oriented. Motor functions intact. Lab / Imaging Results   (All laboratory and radiology results have been personally reviewed by myself)  Labs:  No results found for this visit on 09/18/21. Imaging: All Radiology results interpreted by Radiologist unless otherwise noted. No orders to display     ED Course / Medical Decision Making   Medications - No data to display       MDM:   Injury to the left external canal due to on known circumstance. There is evidence of otitis media. Plan is to treat with oral antibiotics and supportive topical care to the internal canal.    Plan of Care/Counseling:  EVA Nascimento reviewed today's visit with the patient in addition to providing specific details for the plan of care and counseling regarding the diagnosis and prognosis. Questions are answered at this time and are agreeable with the plan. Assessment      1. Acute otitis media, unspecified otitis media type    2. Injury of external auditory canal, initial encounter      Plan   Discharged home.   Patient condition is good    New Medications New Prescriptions    AMOXICILLIN (AMOXIL) 500 MG CAPSULE    Take 1 capsule by mouth 3 times daily for 7 days     Electronically signed by EVA Vences   DD: 9/18/21  **This report was transcribed using voice recognition software. Every effort was made to ensure accuracy; however, inadvertent computerized transcription errors may be present.   END OF ED PROVIDER NOTE       Chad Morocho  09/18/21 6421

## 2021-12-09 ENCOUNTER — HOSPITAL ENCOUNTER (EMERGENCY)
Age: 26
Discharge: HOME | End: 2021-12-09
Payer: COMMERCIAL

## 2021-12-09 VITALS
TEMPERATURE: 97.2 F | OXYGEN SATURATION: 98 % | BODY MASS INDEX: 23.54 KG/M2 | WEIGHT: 150 LBS | DIASTOLIC BLOOD PRESSURE: 80 MMHG | HEIGHT: 67 IN | HEART RATE: 77 BPM | RESPIRATION RATE: 18 BRPM | SYSTOLIC BLOOD PRESSURE: 132 MMHG

## 2021-12-09 DIAGNOSIS — K04.7 DENTAL ABSCESS: Primary | ICD-10-CM

## 2021-12-09 PROCEDURE — 99283 EMERGENCY DEPT VISIT LOW MDM: CPT

## 2021-12-09 RX ORDER — LIDOCAINE HYDROCHLORIDE 20 MG/ML
10 SOLUTION OROPHARYNGEAL PRN
Qty: 500 ML | Refills: 2 | Status: SHIPPED | OUTPATIENT
Start: 2021-12-09 | End: 2022-02-08

## 2021-12-09 RX ORDER — HYDROCODONE BITARTRATE AND ACETAMINOPHEN 5; 325 MG/1; MG/1
1 TABLET ORAL EVERY 6 HOURS PRN
Qty: 8 TABLET | Refills: 0 | Status: SHIPPED | OUTPATIENT
Start: 2021-12-09 | End: 2021-12-11

## 2021-12-09 RX ORDER — CLINDAMYCIN HYDROCHLORIDE 150 MG/1
450 CAPSULE ORAL 3 TIMES DAILY
Qty: 90 CAPSULE | Refills: 0 | Status: SHIPPED | OUTPATIENT
Start: 2021-12-09 | End: 2021-12-19

## 2021-12-09 ASSESSMENT — PAIN DESCRIPTION - LOCATION: LOCATION: TEETH

## 2021-12-09 ASSESSMENT — PAIN SCALES - GENERAL: PAINLEVEL_OUTOF10: 9

## 2021-12-09 ASSESSMENT — PAIN DESCRIPTION - ORIENTATION: ORIENTATION: LEFT;UPPER

## 2021-12-09 ASSESSMENT — PAIN DESCRIPTION - PAIN TYPE: TYPE: ACUTE PAIN

## 2021-12-09 NOTE — ED PROVIDER NOTES
[facility]     Department of Emergency Medicine   ED  Provider Note  Admit Date/RoomTime: 12/9/2021  3:07 PM  ED Room: /    CHIEF COMPLAINT:   Chief Complaint   Patient presents with    Dental Pain     Left upper dental pain with swelling.      ---------------------------------HISTORY OF PRESENT ILLNESS-----------------------------------     Vicente Zaidi is a 27 y.o. male presenting to the ED for left upper dental pain that began yesterday. Patient states he did notice an abscess and tried to drain it himself at home. He states he did get some purulent material out of it. He denies any fever/chills,. He denies any recent trauma or injury. He has no difficulty with handling his secretions or breathing. He has no chest pain, shortness of breath, abdominal pain, nausea, vomiting, or neck pain. He does have some swollen glands to the left side of his jaw. He is alert and oriented x3 and in no apparent distress at this exam.  Patient is nontoxic-appearing. Review of Systems:   Pertinent positives and negatives are stated within HPI, all other systems reviewed and are negative.    --------------------------------------------- PAST HISTORY ---------------------------------------------    Past Medical History:  has no past medical history on file. Past Surgical History:  has a past surgical history that includes [place] tooth extraction; Retinal detachment surgery; and Cataract removal.    Social History:  reports that he has been smoking cigarettes. He has been smoking about 25.00 packs per day. His smokeless tobacco use includes chew. He reports that he does not drink alcohol and does not use drugs. Family History: family history is not on file. The patients home medications have been reviewed.     Allergies: Daytime severe cold & flu [phenylephrine-dm-gg-apap] and Bee venom  Allergies have been reviewed with patient.     -------------------------------------------------- RESULTS -------------------------------------------------  All laboratory and radiology results have been personally reviewed by myself   LABS:  No results found for this visit on 12/09/21. RADIOLOGY:  Interpreted by Radiologist.  No orders to display     ------------------------- NURSING NOTES AND VITALS REVIEWED ---------------------------  The nursing notes within the ED encounter and vital signs as below have been reviewed. /80   Pulse 77   Temp 97.2 °F (36.2 °C) (Temporal)   Resp 18   Ht 5' 7\" (1.702 m)   Wt 150 lb (68 kg)   SpO2 98%   BMI 23.49 kg/m²   Oxygen Saturation Interpretation: Normal    ---------------------------------------------------PHYSICAL EXAM--------------------------------------    Constitutional/General: Alert and oriented x3, well appearing, NAD  HEENT: NC/AT, EOMI, Airway patent  Mouth: The oropharynx is normal. No pharyngeal erythema, no trismus, uvula midline, floor of the mouth is soft. No tenderness in the submental or submandibular space. No tongue elevation. No pain with percussion to teeth, draining abscess near teeth numbers 12 and 13, mild left upper facial edema  Neck: Supple. Non-tender, mild left sided submandibular lymphadenopathy  CVS: Regular rate and rhythm  Resp: Clear and equal bilaterally with good airflow, no distress  Musculo: Moves all extremities x 4. Warm and well perfused, No edema   Integument:  Normal turgor. Warm, dry, without visible rash, unless noted elsewhere. Neurological:  Motor functions intact. GCS 15, CN II-XII grossly intact     ------------------------------ ED COURSE/MEDICAL DECISION MAKING----------------------  ED Medications:  Medications - No data to display    Procedures:  None    Consultations:   None    Counseling: The emergency provider has spoken with the patient and discussed todays results, in addition to providing specific details for the plan of care and counseling regarding the diagnosis and prognosis.   Questions are answered at this time and they are agreeable with the plan. All results reviewed with pt and all questions answered. Patient understands that he must follow-up with dentist. Patient was advised to return to ED if symptoms worsen or new symptoms develop. Pt remained nontoxic, afebrile, and A&O x4 during this ED visit. They agreed with plan of care, discharge, and importance of follow-up. Pt was in no distress at discharge. Vitals stable. Patient was educated on newly prescribed medication.      --------------------------------- IMPRESSION AND DISPOSITION ---------------------------------    IMPRESSION  1. Dental abscess      DISPOSITION  Discharge to home  Patient condition is good    NEW MEDICATIONS   Discharge Medication List as of 12/9/2021  5:01 PM      START taking these medications    Details   clindamycin (CLEOCIN) 150 MG capsule Take 3 capsules by mouth 3 times daily for 10 days, Disp-90 capsule, R-0Print      lidocaine viscous hcl (XYLOCAINE) 2 % SOLN solution Take 10 mLs by mouth as needed for Irritation Swish and spit q2h prn, Disp-500 mL, R-2Print      HYDROcodone-acetaminophen (NORCO) 5-325 MG per tablet Take 1 tablet by mouth every 6 hours as needed for Pain for up to 2 days. , Disp-8 tablet, R-0Print             NOTE: This report was transcribed using voice recognition software.  Every effort was made to ensure accuracy; however, inadvertent computerized transcription errors may be present    END OF PROVIDER NOTE        Tess Manjarrez PA-C  12/10/21 [id number]

## 2021-12-09 NOTE — Clinical Note
Alissa Turner was seen and treated in our emergency department on 12/9/2021. He may return to work on 12/10/2021. If you have any questions or concerns, please don't hesitate to call.       Pretty Goetz PA-C

## 2022-01-13 ENCOUNTER — APPOINTMENT (OUTPATIENT)
Dept: GENERAL RADIOLOGY | Age: 27
End: 2022-01-13
Payer: COMMERCIAL

## 2022-01-13 LAB
ALBUMIN SERPL-MCNC: 4.8 G/DL (ref 3.5–5.2)
ALP BLD-CCNC: 77 U/L (ref 40–129)
ALT SERPL-CCNC: 11 U/L (ref 0–40)
ANION GAP SERPL CALCULATED.3IONS-SCNC: 14 MMOL/L (ref 7–16)
AST SERPL-CCNC: 15 U/L (ref 0–39)
BASOPHILS ABSOLUTE: 0.06 E9/L (ref 0–0.2)
BASOPHILS RELATIVE PERCENT: 0.5 % (ref 0–2)
BILIRUB SERPL-MCNC: 1.7 MG/DL (ref 0–1.2)
BUN BLDV-MCNC: 27 MG/DL (ref 6–20)
CALCIUM SERPL-MCNC: 9.8 MG/DL (ref 8.6–10.2)
CHLORIDE BLD-SCNC: 95 MMOL/L (ref 98–107)
CO2: 25 MMOL/L (ref 22–29)
CREAT SERPL-MCNC: 0.9 MG/DL (ref 0.7–1.2)
EOSINOPHILS ABSOLUTE: 0.18 E9/L (ref 0.05–0.5)
EOSINOPHILS RELATIVE PERCENT: 1.6 % (ref 0–6)
GFR AFRICAN AMERICAN: >60
GFR NON-AFRICAN AMERICAN: >60 ML/MIN/1.73
GLUCOSE BLD-MCNC: 121 MG/DL (ref 74–99)
HCT VFR BLD CALC: 50.1 % (ref 37–54)
HEMOGLOBIN: 17.3 G/DL (ref 12.5–16.5)
IMMATURE GRANULOCYTES #: 0.04 E9/L
IMMATURE GRANULOCYTES %: 0.3 % (ref 0–5)
LACTIC ACID: 0.9 MMOL/L (ref 0.5–2.2)
LIPASE: 15 U/L (ref 13–60)
LYMPHOCYTES ABSOLUTE: 2.77 E9/L (ref 1.5–4)
LYMPHOCYTES RELATIVE PERCENT: 23.9 % (ref 20–42)
MCH RBC QN AUTO: 29.5 PG (ref 26–35)
MCHC RBC AUTO-ENTMCNC: 34.5 % (ref 32–34.5)
MCV RBC AUTO: 85.5 FL (ref 80–99.9)
MONOCYTES ABSOLUTE: 0.75 E9/L (ref 0.1–0.95)
MONOCYTES RELATIVE PERCENT: 6.5 % (ref 2–12)
NEUTROPHILS ABSOLUTE: 7.77 E9/L (ref 1.8–7.3)
NEUTROPHILS RELATIVE PERCENT: 67.2 % (ref 43–80)
PDW BLD-RTO: 12 FL (ref 11.5–15)
PLATELET # BLD: 274 E9/L (ref 130–450)
PMV BLD AUTO: 9.8 FL (ref 7–12)
POTASSIUM REFLEX MAGNESIUM: 3.6 MMOL/L (ref 3.5–5)
RBC # BLD: 5.86 E12/L (ref 3.8–5.8)
SODIUM BLD-SCNC: 134 MMOL/L (ref 132–146)
TOTAL PROTEIN: 7.5 G/DL (ref 6.4–8.3)
WBC # BLD: 11.6 E9/L (ref 4.5–11.5)

## 2022-01-13 PROCEDURE — 80053 COMPREHEN METABOLIC PANEL: CPT

## 2022-01-13 PROCEDURE — 83605 ASSAY OF LACTIC ACID: CPT

## 2022-01-13 PROCEDURE — 99284 EMERGENCY DEPT VISIT MOD MDM: CPT

## 2022-01-13 PROCEDURE — 71046 X-RAY EXAM CHEST 2 VIEWS: CPT

## 2022-01-13 PROCEDURE — 85025 COMPLETE CBC W/AUTO DIFF WBC: CPT

## 2022-01-13 PROCEDURE — 83690 ASSAY OF LIPASE: CPT

## 2022-01-13 NOTE — ED NOTES
Department of Emergency Medicine    FIRST PROVIDER TRIAGE NOTE             [facility]           1/13/22  6:39 PM EST    Date of Encounter: 1/13/22   MRN: [id number]    Vitals:    01/13/22 1840   BP: 127/77   Pulse: 119   Resp: 18   Temp: 97.5 °F (36.4 °C)   SpO2: 98%   Weight: 145 lb (65.8 kg)   Height: 5' 7\" (1.702 m)        HPI: Maranda Box is a 27 y.o. male who presents to the ED for Emesis (Tested positive for covid on the jan 2, pt took another test and is still positive and he also needs a work excuse. Had vomited 5 times today) and Dizziness  Patient had positive COVID tet today. Originally diagnosed Jan 2. States symptoms are worsening     ROS: Negative for diarrhea or urinary complaints. Physical Exam:   CV: regular rate  Pulm: CTA bilat     Initial Plan of Care: All treatment areas with department are currently occupied. Plan to order/Initiate the following while awaiting opening in ED: labs and imaging studies.     Initial Plan of Care: Initiate Treatment-Testing, Proceed toTreatment Area When Bed Available for ED Attending/MLP to Continue Care    Electronically signed by Telma Jerez PA-C   DD: 1/13/22       Telma Jerez PA-C  01/13/22 [id number]

## 2022-01-14 ENCOUNTER — HOSPITAL ENCOUNTER (EMERGENCY)
Age: 27
Discharge: HOME | End: 2022-01-14
Attending: STUDENT IN AN ORGANIZED HEALTH CARE EDUCATION/TRAINING PROGRAM
Payer: COMMERCIAL

## 2022-01-14 VITALS
WEIGHT: 145 LBS | OXYGEN SATURATION: 97 % | SYSTOLIC BLOOD PRESSURE: 124 MMHG | BODY MASS INDEX: 22.76 KG/M2 | RESPIRATION RATE: 16 BRPM | DIASTOLIC BLOOD PRESSURE: 83 MMHG | HEART RATE: 106 BPM | TEMPERATURE: 96.2 F | HEIGHT: 67 IN

## 2022-01-14 DIAGNOSIS — U07.1 COVID: ICD-10-CM

## 2022-01-14 DIAGNOSIS — R11.2 NAUSEA AND VOMITING, INTRACTABILITY OF VOMITING NOT SPECIFIED, UNSPECIFIED VOMITING TYPE: Primary | ICD-10-CM

## 2022-01-14 PROCEDURE — 96360 HYDRATION IV INFUSION INIT: CPT

## 2022-01-14 PROCEDURE — 6370000000 HC RX 637 (ALT 250 FOR IP): Performed by: PHYSICIAN ASSISTANT

## 2022-01-14 PROCEDURE — 96361 HYDRATE IV INFUSION ADD-ON: CPT

## 2022-01-14 PROCEDURE — 2580000003 HC RX 258: Performed by: PHYSICIAN ASSISTANT

## 2022-01-14 RX ORDER — ONDANSETRON 4 MG/1
4 TABLET, ORALLY DISINTEGRATING ORAL EVERY 8 HOURS PRN
Qty: 10 TABLET | Refills: 0 | Status: SHIPPED | OUTPATIENT
Start: 2022-01-14

## 2022-01-14 RX ORDER — 0.9 % SODIUM CHLORIDE 0.9 %
1000 INTRAVENOUS SOLUTION INTRAVENOUS ONCE
Status: COMPLETED | OUTPATIENT
Start: 2022-01-14 | End: 2022-01-14

## 2022-01-14 RX ORDER — ONDANSETRON 4 MG/1
4 TABLET, ORALLY DISINTEGRATING ORAL ONCE
Status: COMPLETED | OUTPATIENT
Start: 2022-01-14 | End: 2022-01-14

## 2022-01-14 RX ADMIN — ONDANSETRON 4 MG: 4 TABLET, ORALLY DISINTEGRATING ORAL at 01:52

## 2022-01-14 RX ADMIN — SODIUM CHLORIDE 1000 ML: 9 INJECTION, SOLUTION INTRAVENOUS at 01:52

## 2022-01-14 ASSESSMENT — ENCOUNTER SYMPTOMS
CHEST TIGHTNESS: 0
SHORTNESS OF BREATH: 0
COUGH: 0
DIARRHEA: 0
ABDOMINAL PAIN: 0
ABDOMINAL DISTENTION: 0
PHOTOPHOBIA: 0
NAUSEA: 1
VOMITING: 1
BACK PAIN: 0

## 2022-01-14 NOTE — Clinical Note
Mima Mejia was seen and treated in our emergency department on 1/13/2022. He may return to work on 01/17/2022. May return to work if symptoms improve     If you have any questions or concerns, please don't hesitate to call.       Salma Simms MD

## 2022-01-14 NOTE — Clinical Note
Sabi Amaya was seen and treated in our emergency department on 1/13/2022. He may return to work on 01/17/2022. May return to work if symptoms improve     If you have any questions or concerns, please don't hesitate to call.       Adam Anderson MD

## 2022-01-14 NOTE — ED PROVIDER NOTES
Ganga Antony is a 27year old male who present emergency department concern for nausea and vomiting. Patient was positive for COVID on January 2. Patient just took a home COVID test and continues to be positive. Patient still is having chills and fatigue. Patient is also having nausea and vomiting. Patient has not tried thing for symptoms otherwise it is better or worse symptoms are constant moderate in severity. Patient states that the nausea vomiting has been coming and going during his entire illness. Patient has been able to tolerate p.o. fluids at certain points but is still feels ill. Patient denies chest pain, shortness of breath, abdominal pain, fevers. The history is provided by the patient and medical records. Review of Systems   Constitutional: Positive for appetite change, chills and fatigue. Negative for diaphoresis and fever. Eyes: Negative for photophobia and visual disturbance. Respiratory: Negative for cough, chest tightness and shortness of breath. Cardiovascular: Negative for chest pain, palpitations and leg swelling. Gastrointestinal: Positive for nausea and vomiting. Negative for abdominal distention, abdominal pain and diarrhea. Genitourinary: Negative for dysuria. Musculoskeletal: Negative for back pain, neck pain and neck stiffness. Skin: Negative for pallor and rash. Neurological: Negative for headaches. Psychiatric/Behavioral: Negative for confusion. Physical Exam  Vitals and nursing note reviewed. Constitutional:       General: He is not in acute distress. Appearance: He is ill-appearing. HENT:      Head: Normocephalic and atraumatic. Eyes:      General: No scleral icterus. Conjunctiva/sclera: Conjunctivae normal.      Pupils: Pupils are equal, round, and reactive to light. Cardiovascular:      Rate and Rhythm: Normal rate and regular rhythm.    Pulmonary:      Effort: Pulmonary effort is normal.      Breath sounds: Normal breath sounds. Abdominal:      General: Bowel sounds are normal. There is no distension. Palpations: Abdomen is soft. Tenderness: There is no abdominal tenderness. There is no guarding or rebound. Musculoskeletal:      Cervical back: Normal range of motion and neck supple. No rigidity. No muscular tenderness. Right lower leg: No edema. Left lower leg: No edema. Skin:     General: Skin is warm and dry. Capillary Refill: Capillary refill takes less than 2 seconds. Coloration: Skin is not pale. Findings: No erythema or rash. Neurological:      Mental Status: He is alert and oriented to person, place, and time. Psychiatric:         Mood and Affect: Mood normal.          Procedures     MDM  Number of Diagnoses or Management Options  COVID  Nausea and vomiting, intractability of vomiting not specified, unspecified vomiting type  Diagnosis management comments: Brigitte Kendall is a 42-year-old male present emergency department concern for nausea and vomiting. Patient denies any chest pain or shortness of breath patient was positive for COVID in 2 January. Patient is not vaccinated. Patient was treated with Zofran IV fluids emergency department and able to tolerate p.o. fluids. Patient will be discharged home with supportive care patient does have a follow-up appointment scheduled with the primary medicine clinic tomorrow morning. Discussed results and plan with patient on with indications return to emergency department patient is agreeable to plan and well-appearing at time of discharge not any acute distress  Patient is not having any chest pain or shortness of breath and is not hypoxic,              --------------------------------------------- PAST HISTORY ---------------------------------------------  Past Medical History:  has no past medical history on file. Past Surgical History:  has a past surgical history that includes [place] tooth extraction;  Retinal detachment surgery; and Cataract removal.    Social History:  reports that he has been smoking cigarettes. He has been smoking about 25.00 packs per day. His smokeless tobacco use includes chew. He reports that he does not drink alcohol and does not use drugs. Family History: family history is not on file. The patients home medications have been reviewed.     Allergies: Daytime severe cold & flu [phenylephrine-dm-gg-apap] and Bee venom    -------------------------------------------------- RESULTS -------------------------------------------------  Labs:  Results for orders placed or performed during the hospital encounter of 01/14/22   CBC Auto Differential   Result Value Ref Range    WBC 11.6 (H) 4.5 - 11.5 E9/L    RBC 5.86 (H) 3.80 - 5.80 E12/L    Hemoglobin 17.3 (H) 12.5 - 16.5 g/dL    Hematocrit 50.1 37.0 - 54.0 %    MCV 85.5 80.0 - 99.9 fL    MCH 29.5 26.0 - 35.0 pg    MCHC 34.5 32.0 - 34.5 %    RDW 12.0 11.5 - 15.0 fL    Platelets 555 147 - 906 E9/L    MPV 9.8 7.0 - 12.0 fL    Neutrophils % 67.2 43.0 - 80.0 %    Immature Granulocytes % 0.3 0.0 - 5.0 %    Lymphocytes % 23.9 20.0 - 42.0 %    Monocytes % 6.5 2.0 - 12.0 %    Eosinophils % 1.6 0.0 - 6.0 %    Basophils % 0.5 0.0 - 2.0 %    Neutrophils Absolute 7.77 (H) 1.80 - 7.30 E9/L    Immature Granulocytes # 0.04 E9/L    Lymphocytes Absolute 2.77 1.50 - 4.00 E9/L    Monocytes Absolute 0.75 0.10 - 0.95 E9/L    Eosinophils Absolute 0.18 0.05 - 0.50 E9/L    Basophils Absolute 0.06 0.00 - 0.20 E9/L   Comprehensive Metabolic Panel w/ Reflex to MG   Result Value Ref Range    Sodium 134 132 - 146 mmol/L    Potassium reflex Magnesium 3.6 3.5 - 5.0 mmol/L    Chloride 95 (L) 98 - 107 mmol/L    CO2 25 22 - 29 mmol/L    Anion Gap 14 7 - 16 mmol/L    Glucose 121 (H) 74 - 99 mg/dL    BUN 27 (H) 6 - 20 mg/dL    CREATININE 0.9 0.7 - 1.2 mg/dL    GFR Non-African American >60 >=60 mL/min/1.73    GFR African American >60     Calcium 9.8 8.6 - 10.2 mg/dL    Total Protein 7.5 6.4 - 8.3 g/dL    Albumin 4.8 3.5 - 5.2 g/dL    Total Bilirubin 1.7 (H) 0.0 - 1.2 mg/dL    Alkaline Phosphatase 77 40 - 129 U/L    ALT 11 0 - 40 U/L    AST 15 0 - 39 U/L   Lipase   Result Value Ref Range    Lipase 15 13 - 60 U/L   Lactic Acid, Plasma   Result Value Ref Range    Lactic Acid 0.9 0.5 - 2.2 mmol/L       Radiology:  XR CHEST (2 VW)   Final Result   No acute process. ------------------------- NURSING NOTES AND VITALS REVIEWED ---------------------------  Date / Time Roomed:  1/14/2022  1:59 AM  ED Bed Assignment:  23/43    The nursing notes within the ED encounter and vital signs as below have been reviewed. /83   Pulse 106   Temp 96.2 °F (35.7 °C) (Oral)   Resp 16   Ht 5' 7\" (1.702 m)   Wt 145 lb (65.8 kg)   SpO2 97%   BMI 22.71 kg/m²   Oxygen Saturation Interpretation: Normal      ------------------------------------------ PROGRESS NOTES ------------------------------------------  2:26 AM EST  I have spoken with the patient and discussed todays results, in addition to providing specific details for the plan of care and counseling regarding the diagnosis and prognosis. Their questions are answered at this time and they are agreeable with the plan. I discussed at length with them reasons for immediate return here for re evaluation. They will followup with their primary care physician by calling their office tomorrow. --------------------------------- ADDITIONAL PROVIDER NOTES ---------------------------------  At this time the patient is without objective evidence of an acute process requiring hospitalization or inpatient management. They have remained hemodynamically stable throughout their entire ED visit and are stable for discharge with outpatient follow-up. The plan has been discussed in detail and they are aware of the specific conditions for emergent return, as well as the importance of follow-up.       Discharge Medication List as of 1/14/2022  3:42 AM START taking these medications    Details   ondansetron (ZOFRAN ODT) 4 MG disintegrating tablet Take 1 tablet by mouth every 8 hours as needed for Nausea or Vomiting, Disp-10 tablet, R-0Print             Diagnosis:  1. Nausea and vomiting, intractability of vomiting not specified, unspecified vomiting type    2. COVID        Disposition:  Patient's disposition: Discharge to home  Patient's condition is stable.              Linette Saavedra MD  01/15/22 3783

## 2022-01-14 NOTE — ED NOTES
Patient able to eat crackers and jello. States \"I feel better after eating something\".       Umer Nolasco RN  01/14/22 8021

## 2022-02-08 ENCOUNTER — HOSPITAL ENCOUNTER (EMERGENCY)
Age: 27
Discharge: HOME | End: 2022-02-08
Payer: COMMERCIAL

## 2022-02-08 VITALS
RESPIRATION RATE: 14 BRPM | TEMPERATURE: 98 F | BODY MASS INDEX: 23.3 KG/M2 | HEART RATE: 104 BPM | DIASTOLIC BLOOD PRESSURE: 88 MMHG | WEIGHT: 145 LBS | HEIGHT: 66 IN | SYSTOLIC BLOOD PRESSURE: 139 MMHG | OXYGEN SATURATION: 97 %

## 2022-02-08 DIAGNOSIS — K02.9 DENTAL CARIES: Primary | ICD-10-CM

## 2022-02-08 DIAGNOSIS — K04.7 DENTAL ABSCESS: ICD-10-CM

## 2022-02-08 PROCEDURE — 6370000000 HC RX 637 (ALT 250 FOR IP): Performed by: PHYSICIAN ASSISTANT

## 2022-02-08 PROCEDURE — 99284 EMERGENCY DEPT VISIT MOD MDM: CPT

## 2022-02-08 RX ORDER — IBUPROFEN 600 MG/1
600 TABLET ORAL 3 TIMES DAILY PRN
Qty: 30 TABLET | Refills: 0 | Status: SHIPPED | OUTPATIENT
Start: 2022-02-08

## 2022-02-08 RX ORDER — AMOXICILLIN AND CLAVULANATE POTASSIUM 875; 125 MG/1; MG/1
1 TABLET, FILM COATED ORAL 2 TIMES DAILY
Qty: 20 TABLET | Refills: 0 | Status: SHIPPED | OUTPATIENT
Start: 2022-02-08 | End: 2022-02-18

## 2022-02-08 RX ORDER — AMOXICILLIN AND CLAVULANATE POTASSIUM 875; 125 MG/1; MG/1
1 TABLET, FILM COATED ORAL ONCE
Status: COMPLETED | OUTPATIENT
Start: 2022-02-08 | End: 2022-02-08

## 2022-02-08 RX ORDER — IBUPROFEN 800 MG/1
800 TABLET ORAL ONCE
Status: COMPLETED | OUTPATIENT
Start: 2022-02-08 | End: 2022-02-08

## 2022-02-08 RX ADMIN — AMOXICILLIN AND CLAVULANATE POTASSIUM 1 TABLET: 875; 125 TABLET, FILM COATED ORAL at 11:01

## 2022-02-08 RX ADMIN — IBUPROFEN 800 MG: 800 TABLET, FILM COATED ORAL at 11:01

## 2022-02-08 ASSESSMENT — PAIN DESCRIPTION - PAIN TYPE: TYPE: ACUTE PAIN

## 2022-02-08 ASSESSMENT — PAIN DESCRIPTION - LOCATION: LOCATION: JAW

## 2022-02-08 ASSESSMENT — PAIN SCALES - GENERAL
PAINLEVEL_OUTOF10: 7
PAINLEVEL_OUTOF10: 7

## 2022-02-08 ASSESSMENT — PAIN DESCRIPTION - ORIENTATION: ORIENTATION: RIGHT;LOWER

## 2022-02-08 NOTE — Clinical Note
Fredy Hoang was seen and treated in our emergency department on 2/8/2022. He may return to work on 02/09/2022. If you have any questions or concerns, please don't hesitate to call.       Ivonne Almaguer PA-C

## 2022-02-08 NOTE — ED PROVIDER NOTES
[facility]                                                                                                                                    Department of Emergency Medicine   ED  Provider Note  Admit Date/RoomTime: 2/8/2022 10:30 AM  ED Room: 36/36        HPI:  2/8/22,   Time: 10:50 AM ELANA Melendez is a 27 y.o. male presenting to the ED for right sided dental pain and swelling, beginning few days ago. The complaint has been persistent, moderate in severity, and worsened by nothing. The patient states he had a large cavity in in lower molar for awhile. Has been packed with OTC filling for months now. Pt presents with increasing pain at site and swelling of right sided mandible. No redness or drainage reported. Denies fever, chills or vomiting. He has not seen dentist recently. States he has been taking Tylenol at home. ROS:     Constitutional: Negative for fever and chills  HENT: See HPI  Eyes: Negative for pain, discharge and redness  Respiratory:  Negative for shortness of breath, cough and wheezing  Cardiovascular: Negative for CP, edema or palpitations  Gastrointestinal: Negative for nausea, vomiting, diarrhea and abdominal distention  Genitourinary: Negative for dysuria and frequency  Musculoskeletal: Negative for back pain and arthralgia  Skin: Negative for rash and wound  Neurological: Negative for weakness and headaches  Hematological: Negative for adenopathy    All other systems reviewed and are negative      -------------------------------- PAST HISTORY ----------------------------------  Past Medical History:  has no past medical history on file. Past Surgical History:  has a past surgical history that includes [place] tooth extraction; Retinal detachment surgery; and Cataract removal.    Social History:  reports that he has been smoking cigarettes. He has been smoking about 25.00 packs per day. His smokeless tobacco use includes chew.  He reports that he does not drink alcohol and does not use drugs. Family History: family history is not on file. The patients home medications have been reviewed. Allergies: Daytime severe cold & flu [phenylephrine-dm-gg-apap] and Bee venom    --------------------------------- RESULTS ------------------------------------------  All laboratory and radiology results have been personally reviewed by myself   LABS:  No results found for this visit on 02/08/22. RADIOLOGY:  Interpreted by Radiologist.  No orders to display       ----------------- NURSING NOTES AND VITALS REVIEWED ---------------   The nursing notes within the ED encounter and vital signs as below have been reviewed. /88   Pulse 104   Temp 98 °F (36.7 °C) (Temporal)   Resp 14   Ht 5' 6\" (1.676 m)   Wt 145 lb (65.8 kg)   SpO2 97%   BMI 23.40 kg/m²   Oxygen Saturation Interpretation: Normal      --------------------------------PHYSICAL EXAM------------------------------------      Constitutional/General: Alert and oriented x3, well appearing, non toxic in NAD  Head: NC/AT  Eyes: PERRL, EOMI  Mouth:  Central caries to # 28 with OTC filling. The lateral gum line is tender and edematous. No pointing or fluctuance. No trismus. Pt has swelling and tenderness along right mandible as well. Neck: Supple, full ROM, no meningeal signs  Pulmonary: Lungs clear to auscultation bilaterally, no wheezes, rales, or rhonchi. Not in respiratory distress  Cardiovascular:  Regular rate and rhythm, no murmurs, gallops, or rubs. 2+ distal pulses  Extremities: Moves all extremities x 4. Warm and well perfused  Skin: warm and dry without rash  Neurologic: GCS 15,  Intact.   No focal deficits  Psych: Normal Affect      ------------------------ ED COURSE/MEDICAL DECISION MAKING----------------------  Medications   amoxicillin-clavulanate (AUGMENTIN) 875-125 MG per tablet 1 tablet (1 tablet Oral Given 2/8/22 1101)   ibuprofen (ADVIL;MOTRIN) tablet 800 mg (800 mg Oral Given 2/8/22 1101)         Medical Decision Making:    Obvious caries with early abscess in the right lower mandible. Given Augmentin and Motrin here. There is no fluctuance or pointing. I do suggest that the patient get close follow-up either at the dental clinic or private dentist of his choice. I gave him a slip for work today. Counseling: The emergency provider has spoken with the patient and discussed todays results, in addition to providing specific details for the plan of care and counseling regarding the diagnosis and prognosis. Questions are answered at this time and they are agreeable with the plan.      ------------------------ IMPRESSION AND DISPOSITION -------------------------------    IMPRESSION  1. Dental caries    2.  Dental abscess        DISPOSITION  Disposition: Discharge to home  Patient condition is stable                   Missy Montelongo PA-C  02/08/22 1129

## 2024-09-18 VITALS
DIASTOLIC BLOOD PRESSURE: 94 MMHG | BODY MASS INDEX: 24.11 KG/M2 | HEART RATE: 106 BPM | WEIGHT: 150 LBS | SYSTOLIC BLOOD PRESSURE: 134 MMHG | HEIGHT: 66 IN | RESPIRATION RATE: 18 BRPM | TEMPERATURE: 98.2 F | OXYGEN SATURATION: 95 %

## 2024-09-18 PROCEDURE — 99281 EMR DPT VST MAYX REQ PHY/QHP: CPT

## 2024-09-18 ASSESSMENT — PAIN - FUNCTIONAL ASSESSMENT
PAIN_FUNCTIONAL_ASSESSMENT: ACTIVITIES ARE NOT PREVENTED
PAIN_FUNCTIONAL_ASSESSMENT: 0-10

## 2024-09-18 ASSESSMENT — PAIN SCALES - GENERAL: PAINLEVEL_OUTOF10: 6

## 2024-09-18 ASSESSMENT — PAIN DESCRIPTION - ORIENTATION: ORIENTATION: RIGHT;LEFT;LOWER;MID;UPPER

## 2024-09-18 ASSESSMENT — PAIN DESCRIPTION - DESCRIPTORS: DESCRIPTORS: SHARP

## 2024-09-18 ASSESSMENT — PAIN DESCRIPTION - LOCATION: LOCATION: ABDOMEN

## 2024-09-19 ENCOUNTER — HOSPITAL ENCOUNTER (EMERGENCY)
Age: 29
Discharge: LEFT BEFORE DISCHARGE | End: 2024-09-19